# Patient Record
Sex: FEMALE | Race: WHITE | Employment: OTHER | URBAN - METROPOLITAN AREA
[De-identification: names, ages, dates, MRNs, and addresses within clinical notes are randomized per-mention and may not be internally consistent; named-entity substitution may affect disease eponyms.]

---

## 2017-03-07 ENCOUNTER — HOSPITAL ENCOUNTER (OUTPATIENT)
Dept: LAB | Age: 77
Discharge: HOME | End: 2017-03-07
Attending: INTERNAL MEDICINE
Payer: MEDICARE

## 2017-03-07 DIAGNOSIS — E05.90 HYPERTHYROIDISM: ICD-10-CM

## 2017-03-07 LAB
T3 SERPL-MCNC: 1.22 NG/ML (ref 0.6–1.81)
T4 FREE SERPL-MCNC: 0.9 NG/DL (ref 0.78–1.46)
TSH SERPL DL<=0.005 MIU/L-ACNC: 1.42 UIU/ML (ref 0.36–3.74)

## 2017-03-07 PROCEDURE — 84480 ASSAY TRIIODOTHYRONINE (T3): CPT | Performed by: INTERNAL MEDICINE

## 2017-03-07 PROCEDURE — 84443 ASSAY THYROID STIM HORMONE: CPT | Performed by: INTERNAL MEDICINE

## 2017-03-07 PROCEDURE — 84439 ASSAY OF FREE THYROXINE: CPT | Performed by: INTERNAL MEDICINE

## 2017-03-07 PROCEDURE — 36415 COLL VENOUS BLD VENIPUNCTURE: CPT | Performed by: INTERNAL MEDICINE

## 2017-07-10 ENCOUNTER — HOSPITAL ENCOUNTER (OUTPATIENT)
Dept: LAB | Age: 77
Discharge: HOME | End: 2017-07-10
Attending: INTERNAL MEDICINE
Payer: MEDICARE

## 2017-07-10 DIAGNOSIS — E05.90 HYPERTHYROIDISM: ICD-10-CM

## 2017-07-10 LAB
T3 SERPL-MCNC: 1.17 NG/ML (ref 0.6–1.81)
T4 FREE SERPL-MCNC: 1 NG/DL (ref 0.78–1.46)
TSH SERPL DL<=0.005 MIU/L-ACNC: 0.66 UIU/ML (ref 0.36–3.74)

## 2017-07-10 PROCEDURE — 84443 ASSAY THYROID STIM HORMONE: CPT | Performed by: INTERNAL MEDICINE

## 2017-07-10 PROCEDURE — 84439 ASSAY OF FREE THYROXINE: CPT | Performed by: INTERNAL MEDICINE

## 2017-07-10 PROCEDURE — 84480 ASSAY TRIIODOTHYRONINE (T3): CPT | Performed by: INTERNAL MEDICINE

## 2017-07-10 PROCEDURE — 36415 COLL VENOUS BLD VENIPUNCTURE: CPT | Performed by: INTERNAL MEDICINE

## 2017-11-10 ENCOUNTER — HOSPITAL ENCOUNTER (OUTPATIENT)
Dept: LAB | Age: 77
Discharge: HOME | End: 2017-11-10
Payer: MEDICARE

## 2017-11-10 DIAGNOSIS — E05.90 HYPERTHYROIDISM: ICD-10-CM

## 2017-11-10 LAB
T3 SERPL-MCNC: 1.22 NG/ML (ref 0.6–1.81)
T4 FREE SERPL-MCNC: 1 NG/DL (ref 0.78–1.46)
TSH SERPL DL<=0.005 MIU/L-ACNC: 1.12 UIU/ML (ref 0.36–3.74)

## 2017-11-10 PROCEDURE — 84439 ASSAY OF FREE THYROXINE: CPT | Performed by: INTERNAL MEDICINE

## 2017-11-10 PROCEDURE — 36415 COLL VENOUS BLD VENIPUNCTURE: CPT | Performed by: INTERNAL MEDICINE

## 2017-11-10 PROCEDURE — 84480 ASSAY TRIIODOTHYRONINE (T3): CPT | Performed by: INTERNAL MEDICINE

## 2017-11-10 PROCEDURE — 84443 ASSAY THYROID STIM HORMONE: CPT | Performed by: INTERNAL MEDICINE

## 2017-11-21 PROBLEM — M17.0 PRIMARY OSTEOARTHRITIS OF BOTH KNEES: Status: ACTIVE | Noted: 2017-11-21

## 2018-02-21 PROBLEM — E11.21 TYPE 2 DIABETES WITH NEPHROPATHY (HCC): Status: ACTIVE | Noted: 2018-02-21

## 2019-05-20 ENCOUNTER — HOSPITAL ENCOUNTER (OUTPATIENT)
Dept: LAB | Age: 79
Discharge: HOME | End: 2019-05-20
Payer: MEDICARE

## 2019-05-20 LAB
ALBUMIN SERPL-MCNC: 3.5 G/DL (ref 3.2–4.6)
ALBUMIN/GLOB SERPL: 0.9 {RATIO} (ref 1.2–3.5)
ALP SERPL-CCNC: 67 U/L (ref 50–136)
ALT SERPL-CCNC: 17 U/L (ref 12–65)
AST SERPL-CCNC: 20 U/L (ref 15–37)
BILIRUB DIRECT SERPL-MCNC: 0.1 MG/DL
BILIRUB SERPL-MCNC: 0.3 MG/DL (ref 0.2–1.1)
GLOBULIN SER CALC-MCNC: 3.8 G/DL (ref 2.3–3.5)
PROT SERPL-MCNC: 7.3 G/DL (ref 6.3–8.2)
T3 SERPL-MCNC: 1.11 NG/ML (ref 0.6–1.81)
T4 FREE SERPL-MCNC: 1 NG/DL (ref 0.78–1.46)
TSH SERPL DL<=0.005 MIU/L-ACNC: 0.79 UIU/ML (ref 0.36–3.74)

## 2019-05-20 PROCEDURE — 84480 ASSAY TRIIODOTHYRONINE (T3): CPT

## 2019-05-20 PROCEDURE — 80076 HEPATIC FUNCTION PANEL: CPT

## 2019-05-20 PROCEDURE — 84439 ASSAY OF FREE THYROXINE: CPT

## 2019-05-20 PROCEDURE — 36415 COLL VENOUS BLD VENIPUNCTURE: CPT

## 2019-05-20 PROCEDURE — 84443 ASSAY THYROID STIM HORMONE: CPT

## 2019-07-22 ENCOUNTER — HOSPITAL ENCOUNTER (OUTPATIENT)
Dept: MAMMOGRAPHY | Age: 79
Discharge: HOME | End: 2019-07-22
Attending: FAMILY MEDICINE
Payer: MEDICARE

## 2019-07-22 DIAGNOSIS — Z12.31 ENCOUNTER FOR SCREENING MAMMOGRAM FOR BREAST CANCER: ICD-10-CM

## 2019-07-22 PROCEDURE — 77067 SCR MAMMO BI INCL CAD: CPT

## 2019-12-16 PROBLEM — N18.30 CKD (CHRONIC KIDNEY DISEASE) STAGE 3, GFR 30-59 ML/MIN (HCC): Status: ACTIVE | Noted: 2019-12-16

## 2020-07-21 ENCOUNTER — HOSPITAL ENCOUNTER (OUTPATIENT)
Dept: LAB | Age: 80
Discharge: HOME | End: 2020-07-21
Payer: MEDICARE

## 2020-07-21 DIAGNOSIS — E05.90 HYPERTHYROIDISM: Chronic | ICD-10-CM

## 2020-07-21 LAB
ALBUMIN SERPL-MCNC: 3.6 G/DL (ref 3.2–4.6)
ALBUMIN/GLOB SERPL: 0.9 {RATIO} (ref 1.2–3.5)
ALP SERPL-CCNC: 67 U/L (ref 50–136)
ALT SERPL-CCNC: 19 U/L (ref 12–65)
AST SERPL-CCNC: 18 U/L (ref 15–37)
BILIRUB DIRECT SERPL-MCNC: 0.1 MG/DL
BILIRUB SERPL-MCNC: 0.3 MG/DL (ref 0.2–1.1)
GLOBULIN SER CALC-MCNC: 4.1 G/DL (ref 2.3–3.5)
PROT SERPL-MCNC: 7.7 G/DL (ref 6.3–8.2)
T3 SERPL-MCNC: 1.3 NG/ML (ref 0.6–1.81)
T4 FREE SERPL-MCNC: 1 NG/DL (ref 0.9–1.8)
TSH SERPL DL<=0.005 MIU/L-ACNC: 0.34 UIU/ML (ref 0.36–3.74)

## 2020-07-21 PROCEDURE — 84480 ASSAY TRIIODOTHYRONINE (T3): CPT

## 2020-07-21 PROCEDURE — 84443 ASSAY THYROID STIM HORMONE: CPT

## 2020-07-21 PROCEDURE — 84439 ASSAY OF FREE THYROXINE: CPT

## 2020-07-21 PROCEDURE — 80076 HEPATIC FUNCTION PANEL: CPT

## 2020-07-21 PROCEDURE — 36415 COLL VENOUS BLD VENIPUNCTURE: CPT

## 2021-03-04 ENCOUNTER — HOSPITAL ENCOUNTER (OUTPATIENT)
Dept: MRI IMAGING | Age: 81
Discharge: HOME | End: 2021-03-04
Attending: FAMILY MEDICINE
Payer: MEDICARE

## 2021-03-04 DIAGNOSIS — M25.551 RIGHT HIP PAIN: ICD-10-CM

## 2021-03-04 PROCEDURE — 73721 MRI JNT OF LWR EXTRE W/O DYE: CPT

## 2021-03-05 NOTE — PROGRESS NOTES
Tell patient that she has no fracture but she does have a partial tear of the hamstring muscle and partial tear of some of the other muscles around the hip.  ~We need to refer her to orthopedics.

## 2021-04-14 ENCOUNTER — HOSPITAL ENCOUNTER (OUTPATIENT)
Dept: PHYSICAL THERAPY | Age: 81
Discharge: HOME | End: 2021-04-14
Payer: MEDICARE

## 2021-04-14 PROCEDURE — 97110 THERAPEUTIC EXERCISES: CPT

## 2021-04-14 PROCEDURE — 97140 MANUAL THERAPY 1/> REGIONS: CPT

## 2021-04-14 PROCEDURE — 97161 PT EVAL LOW COMPLEX 20 MIN: CPT

## 2021-04-14 NOTE — THERAPY EVALUATION
Viktoria Springer : 1940 [place] Secondary: [place] [employer] [address] [facility] [id number] [facility], [address] [place] [address], [place] peterson, [address] [place] [address] Street [phone]   [phone] OUTPATIENT PHYSICAL THERAPY:Initial Assessment 2021 ICD-10: Treatment Diagnosis: Pain in right hip [M25.551] Treatment Diagnosis 2: Generalized Muscle Weakness [M62.81] Treatment Diagnosis 3: other abnormalities of gait and mobility [R26.89] Precautions: Anemia, Cardiomegaly, GERD (gastroesophageal reflux disease), Hypercholesterolemia, Hypertension, Hyperthyroidism,  Multiple thyroid nodules, Osteoarthritis, Pancreatitis, and Type 2 diabetes mellitus Allergies: Atorvastatin and Hydrocodone-acetaminophen TREATMENT PLAN: 
Effective Dates: 2021 TO 2021 (60 days). Frequency/Duration: 2 times a week for 60 Day(s) MEDICAL/REFERRING DIAGNOSIS: 
Pain in right hip [M25.551] Low back pain [M54.5] Spondylosis without myelopathy or radiculopathy, lumbar region [M47.816] DATE OF ONSET: 2020, slipped on dish soap on floor, fell and landed on floor REFERRING PHYSICIAN: Oleg Choi MD Orders: Evaluate and Treat Return MD Appointment: Patient is uncertain at this time. INITIAL ASSESSMENT:  Ms. Francois Floey is a 78 y.o. female presenting to physical therapy with complaints of right posterior thigh pain, tightness, difficulty walking, standing, sitting. She reports that on 2020 she slipped on dish soap on floor, fell. She reports that since fall her pain has progressively gotten worse. She reports having an X-Ray and LISETTE that per patient showed strained hamstring. She reports is has trouble sitting, standing, walking and is taking longer and having more difficulty dressing, bathing. She reports unable to clean, vacuum, mop. She reports having to have to  assist with these activities. Patient reports only getting any real relief with lying down. She reports trying ice and heat, but no real lasting help. Patient presents with increased pain, decreased strength, decreased ROM, decreased flexibility, impaired gait, impaired posture, impaired overhead reach, impaired transfer ability, decreased activity tolerance, and overall impaired functional mobility. Patient is a good candidate for skilled physical therapy interventions to include manual therapy, therapeutic exercise, balance training, gait training, transfer training, postural re-education, body mechanics training, and pain modalities as needed. PROBLEM LIST (Impacting functional limitations): 1. Decreased Strength 2. Decreased ADL/Functional Activities 3. Decreased Transfer Abilities 4. Decreased Ambulation Ability/Technique 5. Decreased Balance 6. Increased Pain 7. Decreased Activity Tolerance 8. Decreased Pacing Skills 9. Decreased Work Simplification/Energy Conservation Techniques 10. Decreased Flexibility/Joint Mobility 11. Edema/Girth 12. Decreased Knowledge of Precautions 13. Decreased [place] with Home Exercise Program INTERVENTIONS PLANNED: (Treatment may consist of any combination of the following) 1. Balance Exercise 2. Bed Mobility 3. Cryotherapy 4. Electrical Stimulation 5. Gait Training 6. Heat 7. Home Exercise Program (HEP) 8. Manual Therapy 9. Neuromuscular Re-education/Strengthening 10. Range of Motion (ROM) 11. Therapeutic Exercise/Strengthening 12. Transcutaneous Electrical Nerve Stimulation (TENS) 13. Ultrasound (US) 14. Trigger Point Dry Needling, (TPDN) GOALS: (Goals have been discussed and agreed upon with patient.) Short-Term Functional Goals: Time Frame: 4/14/2021 to 5/13/2021 1.  Patient demonstrates independence with home exercise program without verbal cueing provided by therapist.  
2. Patient will report no more than 2/10 right hamstring and hip pain at rest in order to demonstrate improved self pain control and tolerance with sitting, standing, walking. 3. Patient will be educated in and demonstrate proper squat lift technique in order to reduce stress on right hip and hamstring , improve safety, and reduce risk of injury. 4. Patient will improve hip flexion to 90 degrees to assist with improved sitting ability and tolerance to assist with eating and dressing, bathing. 5. Patient will improve strength to 4/5 to assist with standing tolerance for prolonged periods. Discharge Goals: Time Frame: 4/14/2021 to 6/13/2021 1. Patient will improve hip flexion to 110 degrees to assist with return to no difficulty with dressing and bathing with improved safety. 2. Patient will improve strength to 5/5 to be able to return to independent mopping, vacuuming, and cleaning. 3. Patient will improve single leg standing to 8 seconds or greater to assist with ascending and descending stairs, steps, curbs. 4. Patient will improve Lower Extremity Functional Scale score to 38/80 from 18/80. Outcome Measure: Tool Used: Lower Extremity Functional Scale (LEFS) Score:  Initial: 18/80 Most Recent: X/80 (Date: -- ) Interpretation of Score: 20 questions each scored on a 5 point scale with 0 representing \"extreme difficulty or unable to perform\" and 4 representing \"no difficulty\". The lower the score, the greater the functional disability. 80/80 represents no disability. Minimal detectable change is 9 points. Tool Used: Modified Oswestry Low Back Pain Questionnaire Score:  Initial: 25/50  Most Recent: X/50 (Date: -- ) Interpretation of Score: Each section is scored on a 0-5 scale, 5 representing the greatest disability. The scores of each section are added together for a total score of 50.    
 
Medical Necessity:  
· Patient is expected to demonstrate progress in strength, range of motion, balance, coordination and functional technique to decreased pain, swelling and improve/safety with dressing, bathing, walking, standing, sitting, cooking, cleaning, mopping, and all home and community functional activities. · Skilled intervention continues to be required due to increased pain, swelling, decreased range of motion, mobility, flexibility, muscle activation, strength, balance, and functional deficits. Reason for Services/Other Comments: 
· Patient continues to require skilled intervention due to decreased and limited functional abilities and increasing complexity of exercises. Total Evaluation Duration: 20 minutes Rehabilitation Potential For Stated Goals: Good Regarding Sil Tyrone's therapy, I certify that the treatment plan above will be carried out by a therapist or under their direction. Thank you for this referral, 
Rosette Kussmaul, PT, DPT, TPS Referring Physician Signature: Joshua Lee,* _______________________________ Date _____________ PAIN/SUBJECTIVE:  
 Initial: Pain Intensity 1: 9 Pain Location 1: Hip Pain Orientation 1: Right  Post Session:  4/10 HISTORY:  
 History of Injury/Illness (Reason for Referral): Ms. Cliff Puckett is a 78 y.o. female presenting to physical therapy with complaints of right posterior thigh pain, tightness, difficulty walking, standing, sitting. She reports that on 12/18/2020 she slipped on dish soap on floor, fell. She reports that since fall her pain has progressively gotten worse. She reports having an X-Ray and LISETTE that per patient showed strained hamstring. She reports is has trouble sitting, standing, walking and is taking longer and having more difficulty dressing, bathing. She reports unable to clean, vacuum, mop. She reports having to have to  assist with these activities. Patient reports only getting any real relief with lying down. She reports trying ice and heat, but no real lasting help. Past Medical History/Comorbidities: Ms. Cliff Puckett  has a past medical history of Anemia, Cardiomegaly, GERD (gastroesophageal reflux disease), Hypercholesterolemia, Hypertension, Hyperthyroidism, Menopause, Multiple thyroid nodules, Osteoarthritis, Pancreatitis, and Type 2 diabetes mellitus. Ms. Kristen Frazier  has a past surgical history that includes hx orthopaedic (as a child); hx endoscopy (5/21/2012); and hx breast biopsy (Left). Social History/Living Environment:  
  Patient lives with her . Patient has to be able to navigate stairs, steps, curbs. Prior Level of Function/Work/Activity: 
Patient was fully independent without limitations. Patient currently is requiring assist from  for cooking, cleaning, mopping, vacuuming. Patient is eager to return to full prior level of function. Dominant Side:  
      RIGHT Ambulatory/Rehab Services H2 Model Falls Risk Assessment Risk Factors: 
     No Risk Factors Identified Ability to Rise from Chair: 
     (1)  Pushes up, successful in one attempt Falls Prevention Plan: No modifications necessary Total: (5 or greater = High Risk): 1 ©2010 [employer] [place] . [facility] Patent #[id number]. Federal Law prohibits the replication, distribution or use without written permission from [employer] [place] OTR Current Medications:   
   
Current Outpatient Medications:  
  amLODIPine (NORVASC) 10 mg tablet, Take 1 Tab by mouth daily. , Disp: 90 Tab, Rfl: 3 
  glipiZIDE SR (Glucotrol XL) 10 mg CR tablet, Take 1 Tab by mouth daily. Indications: type 2 diabetes mellitus, Disp: 90 Tab, Rfl: 3 
  lisinopril-hydroCHLOROthiazide (PRINZIDE, ZESTORETIC) 20-12.5 mg per tablet, Take 2 Tabs by mouth daily. , Disp: 180 Tab, Rfl: 3 
  metFORMIN (GLUCOPHAGE) 500 mg tablet, Take 1 tab in the morning and 2 tab at night  Indications: type 2 diabetes mellitus, Disp: 270 Tab, Rfl: 3 
  rosuvastatin (Crestor) 20 mg tablet, Take 1 Tab by mouth nightly., Disp: 90 Tab, Rfl: 3 
  methIMAzole (TAPAZOLE) 5 mg tablet, Take 1 Tab by mouth daily. , Disp: 90 Tab, Rfl: 3 
  DISABLED PLACARD (DISABLED PLACARD) DMV, Handicap Placard Dx:Osteoarthritis, Disp: 1 Each, Rfl: 0 
  ascorbic acid, vitamin C, (VITAMIN C) 500 mg tablet, Take 500 mg by mouth., Disp: , Rfl:  
  aspirin delayed-release 81 mg tablet, Take 81 mg by mouth., Disp: , Rfl:  
  multivitamin (MULTIPLE VITAMINS) tablet, Take 1 Tab by mouth daily. , Disp: , Rfl:   
 Date Last Reviewed:  4/14/2021 Number of Personal Factors/Comorbidities that affect the Plan of Care: 
 1-2: MODERATE COMPLEXITY EXAMINATION:  
 Patient denies any LE paresthesia. Patient denies any increase of symptoms with cough, sneeze or valsalva. Patient denies any saddle paresthesia or bowel/bladder deficits. Observation/Orthostatic Postural Assessment:   
      Patient demonstrated decreased right lower extremity weight bearing both sitting and standing and with ambulation. Patient demonstrated anterior pelvic tilt. Palpation:   
      Patient demonstrated severe tightness, tenderness, trigger points, right medial and lateral hamstrings, gluteal lisa/medius/minimus. ROM:   
       
AROM/ PROM Left (degrees) Right (degrees) Hip Flexion 100 84 Hip External Rotation (ER) 48 15 Hip Internal Rotation (IR) 35 30 Hip Extension -10 -10 Hip Abduction 40 22 Knee Flexion 100 115 Knee Extension -10 -2 Strength: Motion Tested Left   (*/5) Right  (*/5) Knee Extension 3 3 Knee Flexion 3 3 Hip Flexion 4 3 Hip Extension 2 2 Hip Abduction 2 2 Ankle DF 4 3 Special Tests:   
      Positive Right SLR 27 degrees Hamstring Tightness, Left 70 degrees Passive Accessory Motion:  
      Lumbar Hypomobility, Right Hip Hypomobility All Directions Neurological Screen: Myotomes: Key muscle strength testing through bilateral LE is intact, deficits noted above. Dermatomes: Sensation to light touch for bilateral LE is intact from intact L3 to S1. Reflexes: Patellar (L3/ L4): Intact Achilles (S1/ S2): Intact Functional Mobility:  
      Gait/Ambulation:  Patient demonstrated decreased right hip flexion and extension, decreased right LE weight bearing. Patient ambulated with moderate to severe antalgic gait pattern. Transfers:  Patient required use of % of time. Balance:   
      Single Leg Balance: Right Unable secondary to pain. Left Unable Body Structures Involved: 1. Bones 2. Joints 3. Muscles 4. Ligaments Body Functions Affected: 1. Sensory/Pain 2. Neuromusculoskeletal 
3. Movement Related Activities and Participation Affected: 1. General Tasks and Demands 2. Mobility 3. Self Care 4. Domestic Life 5. Interpersonal Interactions and Relationships 6. Community, Social and [place] Number of elements (examined above) that affect the Plan of Care: 3: MODERATE COMPLEXITY CLINICAL PRESENTATION:  
 Presentation: 
 Stable and uncomplicated: LOW COMPLEXITY CLINICAL DECISION MAKING:  
 Use of outcome tool(s) and clinical judgement create a POC that gives a: Questionable prediction of patient's progress: MODERATE COMPLEXITY

## 2021-04-14 NOTE — PROGRESS NOTES
Stanley Snyder  : 1940  [place]  Secondary: [place] [employer] [address] [facility] [id number] [facility], [address] [place] [address], [place] peterson, [address] [place] [address]  [phone]   [phone]      OUTPATIENT PHYSICAL THERAPY: Daily Treatment Note 2021    ICD-10: Treatment Diagnosis: Pain in right hip [M25.551]                Treatment Diagnosis 2: Generalized Muscle Weakness [M62.81]                Treatment Diagnosis 3: other abnormalities of gait and mobility [R26.89]  Precautions: Anemia, Cardiomegaly, GERD (gastroesophageal reflux disease), Hypercholesterolemia, Hypertension, Hyperthyroidism,  Multiple thyroid nodules, Osteoarthritis, Pancreatitis, and Type 2 diabetes mellitus  Allergies: Atorvastatin and Hydrocodone-acetaminophen   TREATMENT PLAN:  Effective Dates: 2021 TO 2021 (60 days). Frequency/Duration: 2 times a week for 60 Day(s) MEDICAL/REFERRING DIAGNOSIS:  Pain in right hip [M25.551]  Low back pain [M54.5]  Spondylosis without myelopathy or radiculopathy, lumbar region [M47.816]   DATE OF ONSET: 2020, slipped on dish soap on floor, fell and landed on floor  REFERRING PHYSICIAN: Tio Hays,*  MD Orders: Evaluate and Treat   Return MD Appointment: Patient is uncertain at this time. Pre-treatment Symptoms/Complaints:  Patient reports that she is having trouble sitting, a lot of posterior thigh pain and tightness. She reports that she feels that she is sitting on a knot. Pain: Initial: Pain Intensity 1: 9  Pain Location 1: Hip  Pain Orientation 1: Right  Post Session:  4/10   Medications Last Reviewed:  2021  Updated Objective Findings:  See evaluation note from today   TREATMENT:   THERAPEUTIC EXERCISE: (14 minutes):  Exercises per grid below to improve mobility, strength, balance and coordination.   Required moderate visual, verbal, manual and tactile cues to promote proper body alignment, promote proper body posture, promote proper body mechanics and promote proper body breathing techniques. Progressed resistance, range, repetitions and complexity of movement as indicated. Date:  4/14/2021 Date:   Date:     Activity/Exercise Parameters Parameters Parameters   Posterior Pelvic Tilt 1 x 10     SKTC 2 sec hold 2 x 10 reps     Trunk Rotation Supine 3 x 10                               Time spent with patient reviewing proper muscle recruitment and technique with exercises. Therapeutic Neuroscience Education, Sensitive Nerve System 6 minutes    MANUAL THERAPY: (24 minutes): Joint mobilization and Soft tissue mobilization was utilized and necessary because of the patient's restricted joint motion, painful spasm, loss of articular motion and restricted motion of soft tissue   Soft Tissue Mobilization Medial and Lateral Hamstrings   Active Release Hamstrings, Adductors, Hip IR and ER    MODALITIES: (0 minutes):      None Today     HEP: As above; handouts given to patient for all exercises. Treatment/Session Summary:    · Response to Treatment:  Patient reported decreased tightness and pain with treatment. She reports increased tolerance to sitting and standing. She was instructed regarding an initial home exercise program. She would benefit from continued graded progression to return to prior level of function. · Communication/Consultation:  Home Exercise Program  · Equipment provided today:  None today  · Recommendations/Intent for next treatment session: Next visit will focus on mobility, flexibility, range of motion, graded muscle activation and strength, and return to functional abilities.     Total Treatment Billable Duration:  58 minutes: 20 minutes evaluation, 24 minutes manual therapy, 14 minutes therapeutic exercise  PT Patient Time In/Time Out  Time In: 0900  Time Out: [facility] [address], [address], PT

## 2021-04-15 ENCOUNTER — HOSPITAL ENCOUNTER (OUTPATIENT)
Dept: PHYSICAL THERAPY | Age: 81
Discharge: HOME | End: 2021-04-15
Payer: MEDICARE

## 2021-04-15 PROCEDURE — 97110 THERAPEUTIC EXERCISES: CPT

## 2021-04-15 PROCEDURE — 97140 MANUAL THERAPY 1/> REGIONS: CPT

## 2021-04-15 NOTE — PROGRESS NOTES
Sheila Dumont  : 1940  [place]  Secondary: [place] [employer] [address] [facility]  [address] [employer] [place], [address] [place] [address], [facility], [address] [place] [address]  [phone]   [phone]      OUTPATIENT PHYSICAL THERAPY: Daily Treatment Note 4/15/2021    ICD-10: Treatment Diagnosis: Pain in right hip [M25.551]                Treatment Diagnosis 2: Generalized Muscle Weakness [M62.81]                Treatment Diagnosis 3: other abnormalities of gait and mobility [R26.89]  Precautions: Anemia, Cardiomegaly, GERD (gastroesophageal reflux disease), Hypercholesterolemia, Hypertension, Hyperthyroidism,  Multiple thyroid nodules, Osteoarthritis, Pancreatitis, and Type 2 diabetes mellitus  Allergies: Atorvastatin and Hydrocodone-acetaminophen   TREATMENT PLAN:  Effective Dates: 2021 TO 2021 (60 days). Frequency/Duration: 2 times a week for 60 Day(s) MEDICAL/REFERRING DIAGNOSIS:  Pain in right hip [M25.551]  Low back pain [M54.5]  Spondylosis without myelopathy or radiculopathy, lumbar region [M47.816]   DATE OF ONSET: 2020, slipped on dish soap on floor, fell and landed on floor  REFERRING PHYSICIAN: Justin Plata,*  MD Orders: Evaluate and Treat   Return MD Appointment: Patient is uncertain at this time. Pre-treatment Symptoms/Complaints:  Patient reports that she felt so much better after initial session. She reports that she was tired was able to go home and take a nap. She reports that she also had a first full nights sleep in months, \"did not want to get up it felt so good. \"     Pain: Initial: Pain Intensity 1: 0  Pain Location 1: Hip  Pain Orientation 1: Right  Post Session:  0/10   Medications Last Reviewed:  4/15/2021  Updated Objective Findings:  Palpation: Right Medial and Lateral Hamstring Tightness, Tenderness Trigger Points.    TREATMENT:   THERAPEUTIC EXERCISE: (24 minutes):  Exercises per grid below to improve mobility, strength, balance and coordination. Required moderate visual, verbal, manual and tactile cues to promote proper body alignment, promote proper body posture, promote proper body mechanics and promote proper body breathing techniques. Progressed resistance, range, repetitions and complexity of movement as indicated. Date:  4/14/2021 Date:  4/15/2021 Date:     Activity/Exercise Parameters Parameters Parameters   Posterior Pelvic Tilt 1 x 10 1 x 10    SKTC 2 sec hold 2 x 10 reps     Trunk Rotation Supine 3 x 10 Supine 3 x 10    Hip Abduction  Seated 3 x 10 Yellow    Hip Flexion  Seated March 3 x 10 Yellow                  Time spent with patient reviewing proper muscle recruitment and technique with exercises. Therapeutic Neuroscience Education, Sensitive Nerve System 6 minutes    MANUAL THERAPY: (29 minutes): Joint mobilization and Soft tissue mobilization was utilized and necessary because of the patient's restricted joint motion, painful spasm, loss of articular motion and restricted motion of soft tissue   Soft Tissue Mobilization Medial and Lateral Hamstrings   Active Release Hamstrings, Adductors, Hip IR and ER   Hip IR/ER Log Roll Mobilization Grade II/III    MODALITIES: (0 minutes):      None Today     HEP: As above; handouts given to patient for all exercises. Treatment/Session Summary:    · Response to Treatment:  Patient reported no pain during or after treatment. She continues to have joint and soft tissue restrictions. She was able to tolerate graded strengthening, responded well to manual interventions. She would benefit from continued progression of mobility, flexibility, and muscle activation to progress strength and stability functionally with all daily activities.   · Communication/Consultation:  Home Exercise Program  · Equipment provided today:  None today  · Recommendations/Intent for next treatment session: Next visit will focus on mobility, flexibility, range of motion, graded muscle activation and strength, and return to functional abilities.     Total Treatment Billable Duration:  53 minutes  PT Patient Time In/Time Out  Time In: 1001  Time Out: [facility], PT

## 2021-04-19 ENCOUNTER — HOSPITAL ENCOUNTER (OUTPATIENT)
Dept: PHYSICAL THERAPY | Age: 81
Discharge: HOME | End: 2021-04-19
Payer: MEDICARE

## 2021-04-19 PROCEDURE — 97140 MANUAL THERAPY 1/> REGIONS: CPT

## 2021-04-19 PROCEDURE — 97110 THERAPEUTIC EXERCISES: CPT

## 2021-04-19 NOTE — PROGRESS NOTES
Denton Gomez  : 1940  [place]  Secondary: [place] [employer] [address] at [facility]  [address] [place], [address] [place] [address], [place] peterson, [address] [place] [address]  [phone]   [phone]      OUTPATIENT PHYSICAL THERAPY: Daily Treatment Note 2021    ICD-10: Treatment Diagnosis: Pain in right hip [M25.551]                Treatment Diagnosis 2: Generalized Muscle Weakness [M62.81]                Treatment Diagnosis 3: other abnormalities of gait and mobility [R26.89]  Precautions: Anemia, Cardiomegaly, GERD (gastroesophageal reflux disease), Hypercholesterolemia, Hypertension, Hyperthyroidism,  Multiple thyroid nodules, Osteoarthritis, Pancreatitis, and Type 2 diabetes mellitus  Allergies: Atorvastatin and Hydrocodone-acetaminophen   TREATMENT PLAN:  Effective Dates: 2021 TO 2021 (60 days). Frequency/Duration: 2 times a week for 60 Day(s) MEDICAL/REFERRING DIAGNOSIS:  Pain in right hip [M25.551]  Low back pain [M54.5]  Spondylosis without myelopathy or radiculopathy, lumbar region [M47.816]   DATE OF ONSET: 2020, slipped on dish soap on floor, fell and landed on floor  REFERRING PHYSICIAN: Terrence Palafox,*  MD Orders: Evaluate and Treat   Return MD Appointment: Patient is uncertain at this time. Pre-treatment Symptoms/Complaints:  Patient reports that she is sleeping better and better each night. She reports just a little bit of pain. Pain: Initial: Pain Intensity 1: 2  Pain Location 1: Hip  Pain Orientation 1: Right  Post Session:  0/10   Medications Last Reviewed:  2021  Updated Objective Findings:  Palpation: Right Medial and Lateral Hamstring Tightness, Tenderness Trigger Points. TREATMENT:   THERAPEUTIC EXERCISE: (24 minutes):  Exercises per grid below to improve mobility, strength, balance and coordination.   Required moderate visual, verbal, manual and tactile cues to promote proper body alignment, promote proper body posture, promote proper body mechanics and promote proper body breathing techniques. Progressed resistance, range, repetitions and complexity of movement as indicated. Date:  4/14/2021 Date:  4/15/2021 Date:  4/19/2021   Activity/Exercise Parameters Parameters Parameters   Posterior Pelvic Tilt 1 x 10 1 x 10    SKTC 2 sec hold 2 x 10 reps  5 sec x 20   Trunk Rotation Supine 3 x 10 Supine 3 x 10 Supine 3 x 10   Hip Abduction  Seated 3 x 10 Yellow Seated 3 x 10 Yellow   Hip Flexion  Seated March 3 x 10 Yellow Seated March 3 x 10 Yellow   Calf Stretch   3 x 30 sec Slantboard           Time spent with patient reviewing proper muscle recruitment and technique with exercises. MANUAL THERAPY: (29 minutes): Joint mobilization and Soft tissue mobilization was utilized and necessary because of the patient's restricted joint motion, painful spasm, loss of articular motion and restricted motion of soft tissue   Soft Tissue Mobilization Medial and Lateral Hamstrings   Active Release Hamstrings, Adductors, Hip IR and ER   Hip IR/ER Log Roll Mobilization Grade II/III    MODALITIES: (0 minutes):      None Today     HEP: As above; handouts given to patient for all exercises. Treatment/Session Summary:    · Response to Treatment:  Patient continued to have hamstring tightness, tenderness, trigger points, but responds well to manual interventions followed by graded exercise. She requires rest breaks due to reported fatigue. She would benefit from continued graded and careful progression to progress back to prior level of function. · Communication/Consultation:  Home Exercise Program  · Equipment provided today:  None today  · Recommendations/Intent for next treatment session: Next visit will focus on mobility, flexibility, range of motion, graded muscle activation and strength, and return to functional abilities.     Total Treatment Billable Duration:  53 minutes  PT Patient Time In/Time Out  Time In: 1267  Time Out: 0412 [facility], PT

## 2021-04-22 ENCOUNTER — HOSPITAL ENCOUNTER (OUTPATIENT)
Dept: PHYSICAL THERAPY | Age: 81
Discharge: HOME | End: 2021-04-22
Payer: MEDICARE

## 2021-04-22 PROCEDURE — 97110 THERAPEUTIC EXERCISES: CPT

## 2021-04-22 PROCEDURE — 97140 MANUAL THERAPY 1/> REGIONS: CPT

## 2021-04-22 NOTE — PROGRESS NOTES
Viktoria Springer  : 1940  [place]  Secondary: [place] [employer] [address] [facility]  [address] [place] [facility], [address] [place] [address], [place], [address] [place] [address]  [phone]   [phone]      OUTPATIENT PHYSICAL THERAPY: Daily Treatment Note 2021    ICD-10: Treatment Diagnosis: Pain in right hip [M25.551]                Treatment Diagnosis 2: Generalized Muscle Weakness [M62.81]                Treatment Diagnosis 3: other abnormalities of gait and mobility [R26.89]  Precautions: Anemia, Cardiomegaly, GERD (gastroesophageal reflux disease), Hypercholesterolemia, Hypertension, Hyperthyroidism,  Multiple thyroid nodules, Osteoarthritis, Pancreatitis, and Type 2 diabetes mellitus  Allergies: Atorvastatin and Hydrocodone-acetaminophen   TREATMENT PLAN:  Effective Dates: 2021 TO 2021 (60 days). Frequency/Duration: 2 times a week for 60 Day(s) MEDICAL/REFERRING DIAGNOSIS:  Pain in right hip [M25.551]  Low back pain [M54.5]  Spondylosis without myelopathy or radiculopathy, lumbar region [M47.816]   DATE OF ONSET: 2020, slipped on dish soap on floor, fell and landed on floor  REFERRING PHYSICIAN: Oleg Choi,*  MD Orders: Evaluate and Treat   Return MD Appointment: Patient is uncertain at this time. Pre-treatment Symptoms/Complaints:  Patient reports that she continues to feel better and is sleeping better, still has trace right posterior thigh pain. Pain: Initial: Pain Intensity 1: 2  Pain Location 1: Hip  Pain Orientation 1: Right  Post Session:  0/10   Medications Last Reviewed:  2021  Updated Objective Findings:  Palpation: Right Medial and Lateral Hamstring Tightness, Tenderness Trigger Points. TREATMENT:   THERAPEUTIC EXERCISE: (24 minutes):  Exercises per grid below to improve mobility, strength, balance and coordination.   Required moderate visual, verbal, manual and tactile cues to promote proper body alignment, promote proper body posture, promote proper body mechanics and promote proper body breathing techniques. Progressed resistance, range, repetitions and complexity of movement as indicated. Date:  4/22/2021 Date:  4/15/2021 Date:  4/19/2021   Activity/Exercise Parameters Parameters Parameters   Posterior Pelvic Tilt  1 x 10    SKTC   5 sec x 20   Trunk Rotation Supine 3 x 10 Supine 3 x 10 Supine 3 x 10   Hip Abduction Seated 3 x 10 Yellow Seated 3 x 10 Yellow Seated 3 x 10 Yellow   Hip Flexion Seated March 3 x 10 Yellow Seated March 3 x 10 Yellow Seated March 3 x 10 Yellow   Calf Stretch 3 x 30 sec Slantboard  3 x 30 sec Slantboard   Hip and Knee Flexion/Extension With swiss ball 3 x 10 Therapist Assisted     Knee Flexion Seated Resisted 3 x 10 Yellow       Time spent with patient reviewing proper muscle recruitment and technique with exercises. MANUAL THERAPY: (29 minutes): Joint mobilization and Soft tissue mobilization was utilized and necessary because of the patient's restricted joint motion, painful spasm, loss of articular motion and restricted motion of soft tissue   Soft Tissue Mobilization Medial and Lateral Hamstrings   Active Release Hamstrings, Adductors, Hip IR and ER   Hip IR/ER Log Roll Mobilization Grade II/III    MODALITIES: (0 minutes):      None Today     HEP: As above; handouts given to patient for all exercises. Treatment/Session Summary:    · Response to Treatment:  Patient continued to have trace to mild right hamstring tightness and trigger points. She reported symptom relief with treatment. She demonstrated improved gait pattern, improved stride length and overall stability. Patient would benefit from continued graded progression of mobility and strength to progress functionally and from a safety standpoint.   · Communication/Consultation:  Home Exercise Program  · Equipment provided today:  None today  · Recommendations/Intent for next treatment session: Next visit will focus on mobility, flexibility, range of motion, graded muscle activation and strength, and return to functional abilities.     Total Treatment Billable Duration:  53 minutes  PT Patient Time In/Time Out  Time In: 1100  Time Out: 4900 [facility] , PT

## 2021-04-28 ENCOUNTER — HOSPITAL ENCOUNTER (OUTPATIENT)
Dept: PHYSICAL THERAPY | Age: 81
Discharge: HOME | End: 2021-04-28
Payer: MEDICARE

## 2021-04-28 PROCEDURE — 97110 THERAPEUTIC EXERCISES: CPT

## 2021-04-28 PROCEDURE — 97140 MANUAL THERAPY 1/> REGIONS: CPT

## 2021-04-28 NOTE — PROGRESS NOTES
Shannan Conquest  : 1940  [place]  Secondary: [place] [employer] [address] [facility]  [address] [place] [facility], [address] [place] [address], [place], [address] [place] [address]  [phone]   [phone]      OUTPATIENT PHYSICAL THERAPY: Daily Treatment Note 2021    ICD-10: Treatment Diagnosis: Pain in right hip [M25.551]                Treatment Diagnosis 2: Generalized Muscle Weakness [M62.81]                Treatment Diagnosis 3: other abnormalities of gait and mobility [R26.89]  Precautions: Anemia, Cardiomegaly, GERD (gastroesophageal reflux disease), Hypercholesterolemia, Hypertension, Hyperthyroidism,  Multiple thyroid nodules, Osteoarthritis, Pancreatitis, and Type 2 diabetes mellitus  Allergies: Atorvastatin and Hydrocodone-acetaminophen   TREATMENT PLAN:  Effective Dates: 2021 TO 2021 (60 days). Frequency/Duration: 2 times a week for 60 Day(s) MEDICAL/REFERRING DIAGNOSIS:  Pain in right hip [M25.551]  Low back pain [M54.5]  Spondylosis without myelopathy or radiculopathy, lumbar region [M47.816]   DATE OF ONSET: 2020, slipped on dish soap on floor, fell and landed on floor  REFERRING PHYSICIAN: Evette Matthew,*  MD Orders: Evaluate and Treat   Return MD Appointment: Patient is uncertain at this time. Pre-treatment Symptoms/Complaints:  Patient reports that she continues to feel better and is sleeping better, still has trace right posterior thigh pain. Pain: Initial: Pain Intensity 1: 2  Pain Location 1: Hip  Pain Orientation 1: Right  Post Session:  0/10   Medications Last Reviewed:  2021  Updated Objective Findings:  Palpation: Right Medial and Lateral Hamstring Tightness, Tenderness Trigger Points, Medial greater then Lateral.   TREATMENT:   THERAPEUTIC EXERCISE: (24 minutes):  Exercises per grid below to improve mobility, strength, balance and coordination.   Required moderate visual, verbal, manual and tactile cues to promote proper body alignment, promote proper body posture, promote proper body mechanics and promote proper body breathing techniques. Progressed resistance, range, repetitions and complexity of movement as indicated. Date:  4/22/2021 Date:  4/28/2021 Date:  4/19/2021   Activity/Exercise Parameters Parameters Parameters   Posterior Pelvic Tilt  1 x 10    SKTC  5 x 10 R LE Therapist Assisted 5 sec x 20   Trunk Rotation Supine 3 x 10 Supine 3 x 10 Supine 3 x 10   Hip Abduction Seated 3 x 10 Yellow Seated 3 x 10 Yellow Seated 3 x 10 Yellow   Hip Flexion Seated March 3 x 10 Yellow Seated March 3 x 10 Yellow Seated March 3 x 10 Yellow   Calf Stretch 3 x 30 sec Slantboard  3 x 30 sec Slantboard   Hip and Knee Flexion/Extension With swiss ball 3 x 10 Therapist Assisted     Knee Flexion Seated Resisted 3 x 10 Yellow       Time spent with patient reviewing proper muscle recruitment and technique with exercises. MANUAL THERAPY: (30 minutes): Joint mobilization and Soft tissue mobilization was utilized and necessary because of the patient's restricted joint motion, painful spasm, loss of articular motion and restricted motion of soft tissue   Soft Tissue Mobilization Medial and Lateral Hamstrings   Active Release Hamstrings, Adductors, Hip IR and ER   Hip IR/ER Log Roll Mobilization Grade II/III    MODALITIES: (0 minutes):      None Today     HEP: As above; handouts given to patient for all exercises. Treatment/Session Summary:    · Response to Treatment:  Patient overall has demonstrated continued decreased pain, improved mobility and movement. She continues to have mid right hamstring tightness, tenderness, trigger points. She has been able to tolerate graded muscle activation and strengthening. She would benefit from progression per tolerance to progress back to full prior level of function.   · Communication/Consultation:  Home Exercise Program  · Equipment provided today:  None today  · Recommendations/Intent for next treatment session: Next visit will focus on mobility, flexibility, range of motion, graded muscle activation and strength, and return to functional abilities. Please progress graded and per patient tolerance.     Total Treatment Billable Duration:  54 minutes  PT Patient Time In/Time Out  Time In: 0900  Time Out: Willy Guerra PT

## 2021-04-28 NOTE — PROGRESS NOTES
Shannan Conquest  : 1940  [place]  Secondary: [place] [employer] [address] at [facility]  [address] [place] [facility], [address] [place] [address], [place], [address] [place] [address] Street  [phone]   [phone]       OUTPATIENT PHYSICAL THERAPY:Progress Report 2021    ICD-10: Treatment Diagnosis: Pain in right hip [M25.551]                Treatment Diagnosis 2: Generalized Muscle Weakness [M62.81]                Treatment Diagnosis 3: other abnormalities of gait and mobility [R26.89]  Precautions: Anemia, Cardiomegaly, GERD (gastroesophageal reflux disease), Hypercholesterolemia, Hypertension, Hyperthyroidism,  Multiple thyroid nodules, Osteoarthritis, Pancreatitis, and Type 2 diabetes mellitus  Allergies: Atorvastatin and Hydrocodone-acetaminophen   TREATMENT PLAN:  Effective Dates: 2021 TO 2021 (60 days). Frequency/Duration: 2 times a week for 60 Day(s) MEDICAL/REFERRING DIAGNOSIS:  Pain in right hip [M25.551]  Low back pain [M54.5]  Spondylosis without myelopathy or radiculopathy, lumbar region [M47.816]   DATE OF ONSET: 2020, slipped on dish soap on floor, fell and landed on floor  REFERRING PHYSICIAN: Reynaldo Strong,*  MD Orders: Evaluate and Treat   Return MD Appointment: Patient is uncertain at this time. INITIAL ASSESSMENT:  Ms. Susan Cunningham is a 78 y.o. female presenting to physical therapy with complaints of right posterior thigh pain, tightness, difficulty walking, standing, sitting. She reports that on 2020 she slipped on dish soap on floor, fell. She reports that since fall her pain has progressively gotten worse. She reports having an X-Ray and LISETTE that per patient showed strained hamstring. She reports is has trouble sitting, standing, walking and is taking longer and having more difficulty dressing, bathing. She reports unable to clean, vacuum, mop. She reports having to have to  assist with these activities. Patient reports only getting any real relief with lying down. She reports trying ice and heat, but no real lasting help. Patient presents with increased pain, decreased strength, decreased ROM, decreased flexibility, impaired gait, impaired posture, impaired overhead reach, impaired transfer ability, decreased activity tolerance, and overall impaired functional mobility. Patient is a good candidate for skilled physical therapy interventions to include manual therapy, therapeutic exercise, balance training, gait training, transfer training, postural re-education, body mechanics training, and pain modalities as needed. PROGRESS REPORT:  Ms. Sari Liang is a 78 y.o. female presenting to physical therapy with complaints of right posterior thigh pain, tightness, difficulty walking, standing, sitting. She has reported significantly decreased pain and improved sleep and function. She continues to have trace to mild pain first this in the morning, but his is improving. She continues to have limitations in soft tissue mobility and muscle strength. Patient has been seen for 5 sessions of physical therapy from 4/14/2021 to 4/28/2021. She reports feeling 75-80% better with all daily activities including sleeping. Patient will benefit from continued skilled physical therapy to address remaining goals and deficits. PROBLEM LIST (Impacting functional limitations):  1. Decreased Strength  2. Decreased ADL/Functional Activities  3. Decreased Transfer Abilities  4. Decreased Ambulation Ability/Technique  5. Decreased Balance  6. Increased Pain  7. Decreased Activity Tolerance  8. Decreased Pacing Skills  9. Decreased Work Simplification/Energy Conservation Techniques  10. Decreased Flexibility/Joint Mobility  11. Edema/Girth  12. Decreased Knowledge of Precautions  13. Decreased [place] with Home Exercise Program INTERVENTIONS PLANNED: (Treatment may consist of any combination of the following)  1. Balance Exercise  2.  Bed Mobility  3. Cryotherapy  4. Electrical Stimulation  5. Gait Training  6. Heat  7. Home Exercise Program (HEP)  8. Manual Therapy  9. Neuromuscular Re-education/Strengthening  10. Range of Motion (ROM)  11. Therapeutic Exercise/Strengthening  12. Transcutaneous Electrical Nerve Stimulation (TENS)  13. Ultrasound (US)  14. Trigger Point Dry Needling, (TPDN)     GOALS: (Goals have been discussed and agreed upon with patient.)  Short-Term Functional Goals: Time Frame: 4/14/2021 to 5/13/2021  1. Patient demonstrates independence with home exercise program without verbal cueing provided by therapist. -ONGOING  2. Patient will report no more than 2/10 right hamstring and hip pain at rest in order to demonstrate improved self pain control and tolerance with sitting, standing, walking. -MET  3. Patient will be educated in and demonstrate proper squat lift technique in order to reduce stress on right hip and hamstring , improve safety, and reduce risk of injury. -ONGOING  4. Patient will improve hip flexion to 90 degrees to assist with improved sitting ability and tolerance to assist with eating and dressing, bathing. -MET  5. Patient will improve strength to 4/5 to assist with standing tolerance for prolonged periods. -ONGOING  Discharge Goals: Time Frame: 4/14/2021 to 6/13/2021  1. Patient will improve hip flexion to 110 degrees to assist with return to no difficulty with dressing and bathing with improved safety. -ONGOING  2. Patient will improve strength to 5/5 to be able to return to independent mopping, vacuuming, and cleaning. -ONGOING  3. Patient will improve single leg standing to 8 seconds or greater to assist with ascending and descending stairs, steps, curbs. -ONGOING  4. Patient will improve Lower Extremity Functional Scale score to 38/80 from 18/80. -MET    Outcome Measure:    Tool Used: Lower Extremity Functional Scale (LEFS)  Score:  Initial: 18/80 Most Recent: 73/80 (Date: 4/28/2021 )   Interpretation of Score: 20 questions each scored on a 5 point scale with 0 representing \"extreme difficulty or unable to perform\" and 4 representing \"no difficulty\". The lower the score, the greater the functional disability. 80/80 represents no disability. Minimal detectable change is 9 points. Tool Used: Modified Oswestry Low Back Pain Questionnaire  Score:  Initial: 25/50  Most Recent: 4/50 (Date: 4/28/2021 )   Interpretation of Score: Each section is scored on a 0-5 scale, 5 representing the greatest disability. The scores of each section are added together for a total score of 50. UPDATED OBJECTIVE MEASURES:  Patient denies any LE paresthesia. Patient denies any increase of symptoms with cough, sneeze or valsalva. Patient denies any saddle paresthesia or bowel/bladder deficits. Observation/Orthostatic Postural Assessment:          Patient demonstrated decreased right lower extremity weight bearing both sitting and standing and with ambulation. Patient demonstrated anterior pelvic tilt. Palpation:          Patient demonstrated mild to moderate tightness, tenderness, trigger points, right medial and lateral hamstrings, gluteal lisa/medius/minimus. ROM:            AROM/ PROM Left (degrees) Right (degrees)   Hip Flexion 100 111 (from 84)   Hip External Rotation (ER) 48 32 (from 15)   Hip Internal Rotation (IR) 35 35 (from 30)   Hip Extension -10 -10   Hip Abduction 40 34 (from 22)   Knee Flexion 100 115   Knee Extension -10 -2     Strength:             Motion Tested Left   (*/5) Right  (*/5)   Knee Extension 3 4 (from 3)   Knee Flexion 3 3   Hip Flexion 4 3   Hip Extension 2 2   Hip Abduction 2 2   Ankle DF 4 3     Special Tests:         Negative Right 72 degrees (from Positive Right SLR 27 degrees Hamstring Tightness), Left 70 degrees  Passive Accessory Motion:         Lumbar Hypomobility, Right Hip Hypomobility All Directions  Neurological Screen:              Myotomes: Key muscle strength testing through bilateral LE is intact, deficits noted above. Dermatomes: Sensation to light touch for bilateral LE is intact from intact L3 to S1. Reflexes: Patellar (L3/ L4): Intact                 Achilles (S1/ S2): Intact          Functional Mobility:         Gait/Ambulation:  Patient demonstrated slightly decreased right hip flexion and extension, decreased right LE weight bearing. Patient ambulated with moderate to severe antalgic gait pattern. Transfers:  Patient required use of UE 50% of time. Balance:          Single Leg Balance: Right Unable secondary to pain. Left Unable    Medical Necessity:   · Patient is expected to demonstrate progress in strength, range of motion, balance, coordination and functional technique to decreased pain, swelling and improve/safety with dressing, bathing, walking, standing, sitting, cooking, cleaning, mopping, and all home and community functional activities. · Skilled intervention continues to be required due to increased pain, swelling, decreased range of motion, mobility, flexibility, muscle activation, strength, balance, and functional deficits. Reason for Services/Other Comments:  · Patient continues to require skilled intervention due to decreased and limited functional abilities and increasing complexity of exercises. Rehabilitation Potential For Stated Goals: Good  Regarding Monique Blanco therapy, I certify that the treatment plan above will be carried out by a therapist or under their direction. Thank you for this referral,  Nunu Andres, PT, DPT, TPS  Referring Physician Signature: Ernestina Ndiaye,* No Signature is Required for this note.

## 2021-04-30 ENCOUNTER — HOSPITAL ENCOUNTER (OUTPATIENT)
Dept: PHYSICAL THERAPY | Age: 81
Discharge: HOME | End: 2021-04-30
Payer: MEDICARE

## 2021-04-30 PROCEDURE — 97140 MANUAL THERAPY 1/> REGIONS: CPT

## 2021-04-30 PROCEDURE — 97110 THERAPEUTIC EXERCISES: CPT

## 2021-04-30 NOTE — PROGRESS NOTES
Gisel Barragan  : 1940  [place]  Secondary: [place] [employer] [address] [facility] at [facility]  [address] [facility], [address] [place] [address], [address]  [phone]   [phone]      OUTPATIENT PHYSICAL THERAPY: Daily Treatment Note 2021    ICD-10: Treatment Diagnosis: Pain in right hip [M25.551]                Treatment Diagnosis 2: Generalized Muscle Weakness [M62.81]                Treatment Diagnosis 3: other abnormalities of gait and mobility [R26.89]  Precautions: Anemia, Cardiomegaly, GERD (gastroesophageal reflux disease), Hypercholesterolemia, Hypertension, Hyperthyroidism,  Multiple thyroid nodules, Osteoarthritis, Pancreatitis, and Type 2 diabetes mellitus  Allergies: Atorvastatin and Hydrocodone-acetaminophen   TREATMENT PLAN:  Effective Dates: 2021 TO 2021 (60 days). Frequency/Duration: 2 times a week for 60 Day(s) MEDICAL/REFERRING DIAGNOSIS:  Pain in right hip [M25.551]  Low back pain [M54.5]  Spondylosis without myelopathy or radiculopathy, lumbar region [M47.816]   DATE OF ONSET: 2020, slipped on dish soap on floor, fell and landed on floor  REFERRING PHYSICIAN: Dyan Rubio,*  MD Orders: Evaluate and Treat   Return MD Appointment: Patient is uncertain at this time. Pre-treatment Symptoms/Complaints:  Patient reports steady improvement with therapy. Reports minimal pain and sleeping much better. .     Pain: Initial: Pain Intensity 1: 2  Pain Location 1: Leg  Pain Orientation 1: Right, Posterior  Post Session:  0/10   Medications Last Reviewed:  2021  Updated Objective Findings:  Palpation: Right Medial and Lateral Hamstring Tightness, Tenderness Trigger Points, Medial greater then Lateral.   TREATMENT:   THERAPEUTIC EXERCISE: (24 minutes):  Exercises per grid below to improve mobility, strength, balance and coordination.   Required moderate visual, verbal, manual and tactile cues to promote proper body alignment, promote proper body posture, promote proper body mechanics and promote proper body breathing techniques. Progressed resistance, range, repetitions and complexity of movement as indicated. Date:  4/22/2021 Date:  4/28/2021 Date:  4/30/2021   Activity/Exercise Parameters Parameters Parameters   Posterior Pelvic Tilt  1 x 10 2 x 10   SKTC  5 x 10 R LE Therapist Assisted 5 sec x 20   Trunk Rotation Supine 3 x 10 Supine 3 x 10 Supine 3 x 10   Hip Abduction Seated 3 x 10 Yellow Seated 3 x 10 Yellow Seated 3 x 10 red    Hip Flexion Seated March 3 x 10 Yellow Seated March 3 x 10 Yellow Seated March 3 x 10 Yellow   Calf Stretch 3 x 30 sec Slantboard  3 x 30 sec Slantboard   Hip and Knee Flexion/Extension With swiss ball 3 x 10 Therapist Assisted     Knee Flexion Seated Resisted 3 x 10 Yellow  Yellow 3 x 10     Time spent with patient reviewing proper muscle recruitment and technique with exercises. MANUAL THERAPY: (30 minutes): Joint mobilization and Soft tissue mobilization was utilized and necessary because of the patient's restricted joint motion, painful spasm, loss of articular motion and restricted motion of soft tissue   Soft Tissue Mobilization Medial and Lateral Hamstrings   Active Release Hamstrings, Adductors, Hip IR and ER   Hip IR/ER Log Roll Mobilization Grade II/III    MODALITIES: (0 minutes):      None Today     HEP: As above; handouts given to patient for all exercises. Treatment/Session Summary:    · Response to Treatment:  Patient tolerated exercises well. Requires frequent rest breaks. .  · Communication/Consultation:  Home Exercise Program  · Equipment provided today:  None today  · Recommendations/Intent for next treatment session: Next visit will focus on mobility, flexibility, range of motion, graded muscle activation and strength, and return to functional abilities. Please progress graded and per patient tolerance.     Total Treatment Billable Duration:  54 minutes  PT Patient Time In/Time Out  Time In: 1000  Time Out: Magdy Del Cid, KEERTHI

## 2021-05-04 ENCOUNTER — HOSPITAL ENCOUNTER (OUTPATIENT)
Dept: PHYSICAL THERAPY | Age: 81
Discharge: HOME | End: 2021-05-04
Payer: MEDICARE

## 2021-05-04 PROCEDURE — 97140 MANUAL THERAPY 1/> REGIONS: CPT

## 2021-05-04 PROCEDURE — 97110 THERAPEUTIC EXERCISES: CPT

## 2021-05-04 NOTE — PROGRESS NOTES
Brien Wilkes  : 1940  [place]  Secondary: [place] [employer] [address] at [address]  [address] [place], [address], [place], [address] [place] [address] Street  [phone]   [phone]      OUTPATIENT PHYSICAL THERAPY: Daily Treatment Note 2021    ICD-10: Treatment Diagnosis: Pain in right hip [M25.551]                Treatment Diagnosis 2: Generalized Muscle Weakness [M62.81]                Treatment Diagnosis 3: other abnormalities of gait and mobility [R26.89]  Precautions: Anemia, Cardiomegaly, GERD (gastroesophageal reflux disease), Hypercholesterolemia, Hypertension, Hyperthyroidism,  Multiple thyroid nodules, Osteoarthritis, Pancreatitis, and Type 2 diabetes mellitus  Allergies: Atorvastatin and Hydrocodone-acetaminophen   TREATMENT PLAN:  Effective Dates: 2021 TO 2021 (60 days). Frequency/Duration: 2 times a week for 60 Day(s) MEDICAL/REFERRING DIAGNOSIS:  Pain in right hip [M25.551]  Low back pain [M54.5]  Spondylosis without myelopathy or radiculopathy, lumbar region [M47.816]   DATE OF ONSET: 2020, slipped on dish soap on floor, fell and landed on floor  REFERRING PHYSICIAN: Karmen Robert,*  MD Orders: Evaluate and Treat   Return MD Appointment: Patient is uncertain at this time. Pre-treatment Symptoms/Complaints:  Patient reports that she is continue to feel better. Pain: Initial: Pain Intensity 1: 2  Pain Location 1: Leg  Pain Orientation 1: Right, Posterior  Post Session:  0/10   Medications Last Reviewed:  2021  Updated Objective Findings:  Palpation: Right Medial and Lateral Hamstring Tightness, Tenderness Trigger Points, Medial greater then Lateral.   TREATMENT:   THERAPEUTIC EXERCISE: (24 minutes):  Exercises per grid below to improve mobility, strength, balance and coordination.   Required moderate visual, verbal, manual and tactile cues to promote proper body alignment, promote proper body posture, promote proper body mechanics and promote proper body breathing techniques. Progressed resistance, range, repetitions and complexity of movement as indicated. Date:  5/4/2021 Date:  4/28/2021 Date:  4/30/2021   Activity/Exercise Parameters Parameters Parameters   Posterior Pelvic Tilt  1 x 10 2 x 10   SKTC 5 sec x 20 5 x 10 R LE Therapist Assisted 5 sec x 20   Trunk Rotation Supine 3 x 10 Supine 3 x 10 Supine 3 x 10   Hip Abduction  Seated 3 x 10 Yellow Seated 3 x 10 red    Hip Flexion  Seated March 3 x 10 Yellow Seated March 3 x 10 Yellow   Calf Stretch   3 x 30 sec Slantboard   Hip and Knee Flexion/Extension      Knee Flexion   Yellow 3 x 10   Hamstring Curl 3 x 10 11.5 lbs     Trunk Flexion  3 x 10 with Swiss Ball     Cone Stepping UP and OVER FWD Bilateral UE Support 2 x 10                         Time spent with patient reviewing proper muscle recruitment and technique with exercises. MANUAL THERAPY: (30 minutes): Joint mobilization and Soft tissue mobilization was utilized and necessary because of the patient's restricted joint motion, painful spasm, loss of articular motion and restricted motion of soft tissue   Soft Tissue Mobilization Medial and Lateral Hamstrings   Active Release Hamstrings, Adductors, Hip IR and ER   Hip IR/ER Log Roll Mobilization Grade II/III    MODALITIES: (0 minutes):      None Today     HEP: As above; handouts given to patient for all exercises. Treatment/Session Summary:    · Response to Treatment:  Patient reported no pain during or post treatment. She continues to have trace to mild medial hamstring tightness. She was able to progress with graded strengthening, but required rest. She would benefit from continued graded progression of strength to return to full prior level of function.    · Communication/Consultation:  Home Exercise Program  · Equipment provided today:  None today  · Recommendations/Intent for next treatment session: Next visit will focus on mobility, flexibility, range of motion, graded muscle activation and strength, and return to functional abilities. Please progress graded and per patient tolerance.     Total Treatment Billable Duration:  54 minutes  PT Patient Time In/Time Out  Time In: 1000  Time Out: 101 Richy Gan, PT

## 2021-05-06 ENCOUNTER — HOSPITAL ENCOUNTER (OUTPATIENT)
Dept: PHYSICAL THERAPY | Age: 81
Discharge: HOME | End: 2021-05-06
Payer: MEDICARE

## 2021-05-06 PROCEDURE — 97110 THERAPEUTIC EXERCISES: CPT

## 2021-05-06 PROCEDURE — 97140 MANUAL THERAPY 1/> REGIONS: CPT

## 2021-05-06 NOTE — PROGRESS NOTES
Fátima Jimenez  : 1940  [place]  Secondary: [place] [employer] [address] at [facility]  [address] [place], [address] [place] [address], [place], [address] [place] [address] Street  [phone]   [phone]      OUTPATIENT PHYSICAL THERAPY: Daily Treatment Note 2021    ICD-10: Treatment Diagnosis: Pain in right hip [M25.551]                Treatment Diagnosis 2: Generalized Muscle Weakness [M62.81]                Treatment Diagnosis 3: other abnormalities of gait and mobility [R26.89]  Precautions: Anemia, Cardiomegaly, GERD (gastroesophageal reflux disease), Hypercholesterolemia, Hypertension, Hyperthyroidism,  Multiple thyroid nodules, Osteoarthritis, Pancreatitis, and Type 2 diabetes mellitus  Allergies: Atorvastatin and Hydrocodone-acetaminophen   TREATMENT PLAN:  Effective Dates: 2021 TO 2021 (60 days). Frequency/Duration: 2 times a week for 60 Day(s) MEDICAL/REFERRING DIAGNOSIS:  Pain in right hip [M25.551]  Low back pain [M54.5]  Spondylosis without myelopathy or radiculopathy, lumbar region [M47.816]   DATE OF ONSET: 2020, slipped on dish soap on floor, fell and landed on floor  REFERRING PHYSICIAN: Alicia Tyler,*  MD Orders: Evaluate and Treat   Return MD Appointment: Patient is uncertain at this time. Pre-treatment Symptoms/Complaints:  Patient reports that she felt better after the manual interventions last session. She reports across the better feeling better. Pain: Initial: Pain Intensity 1: 2  Pain Location 1: Leg  Pain Orientation 1: Right, Posterior  Post Session:  0/10   Medications Last Reviewed:  2021  Updated Objective Findings:  Palpation: Right Medial and Lateral Hamstring Tightness, Tenderness Trigger Points, Medial greater then Lateral.   TREATMENT:   THERAPEUTIC EXERCISE: (24 minutes):  Exercises per grid below to improve mobility, strength, balance and coordination.   Required moderate visual, verbal, manual and tactile cues to promote proper body alignment, promote proper body posture, promote proper body mechanics and promote proper body breathing techniques. Progressed resistance, range, repetitions and complexity of movement as indicated. Date:  5/4/2021 Date:  5/6/2021 Date:  4/30/2021   Activity/Exercise Parameters Parameters Parameters   Posterior Pelvic Tilt   2 x 10   SKTC 5 sec x 20 5 x 10 R LE Therapist Assisted with Swiss Ball 5 sec x 20   Trunk Rotation Supine 3 x 10 Supine 3 x 10 Supine 3 x 10   Hip Abduction  Supine Hip Abduction Knee Fall Out Red 3 x 10 Seated 3 x 10 red    Hip Flexion   Seated March 3 x 10 Yellow   Calf Stretch   3 x 30 sec Slantboard   Hip and Knee Flexion/Extension      Knee Flexion   Yellow 3 x 10   Hamstring Curl 3 x 10 11.5 lbs 3 x 10 11.5 lbs    Trunk Flexion  3 x 10 with Swiss Ball 3 x 10 with [employer] UP and OVER FWD Bilateral UE Support 2 x 10     Knee Extension  3 x 10 11.5 lbs                  Time spent with patient reviewing proper muscle recruitment and technique with exercises. MANUAL THERAPY: (30 minutes): Joint mobilization and Soft tissue mobilization was utilized and necessary because of the patient's restricted joint motion, painful spasm, loss of articular motion and restricted motion of soft tissue   Soft Tissue Mobilization Medial and Lateral Hamstrings   Active Release Hamstrings, Adductors, Hip IR and ER   Hip IR/ER Log Roll Mobilization Grade II/III    MODALITIES: (0 minutes):      None Today     HEP: As above; handouts given to patient for all exercises. Treatment/Session Summary:    · Response to Treatment:  Patient demonstrated continued decreased hamstring tightness and tenderness. Patient reported no pain during or post treatment. Patient continues to demonstrated improved mobility, flexibility, range of motion. Patient also demonstrated improved strength and endurance.  Patient continues to have strength and functional deficits. She would benefit from continued graded progression of strength and stability to return to all functional abilities. · Communication/Consultation:  Home Exercise Program  · Equipment provided today:  None today  · Recommendations/Intent for next treatment session: Next visit will focus on mobility, flexibility, range of motion, graded muscle activation and strength, and return to functional abilities. Please progress graded and per patient tolerance.     Total Treatment Billable Duration:  54 minutes  PT Patient Time In/Time Out  Time In: 9810  Time Out: JAVIER Acuna 63, PT

## 2021-05-10 ENCOUNTER — HOSPITAL ENCOUNTER (OUTPATIENT)
Dept: PHYSICAL THERAPY | Age: 81
Discharge: HOME | End: 2021-05-10
Payer: MEDICARE

## 2021-05-10 PROCEDURE — 97110 THERAPEUTIC EXERCISES: CPT

## 2021-05-10 NOTE — PROGRESS NOTES
Denton Gomez  : 1940  [place]  Secondary: [place] [employer] [address] [facility]  [address] [facility], [address] [place] [address], [place], [address] [place] [address]  [phone]   [phone]      OUTPATIENT PHYSICAL THERAPY: Daily Treatment Note 5/10/2021    ICD-10: Treatment Diagnosis: Pain in right hip [M25.551]                Treatment Diagnosis 2: Generalized Muscle Weakness [M62.81]                Treatment Diagnosis 3: other abnormalities of gait and mobility [R26.89]  Precautions: Anemia, Cardiomegaly, GERD (gastroesophageal reflux disease), Hypercholesterolemia, Hypertension, Hyperthyroidism,  Multiple thyroid nodules, Osteoarthritis, Pancreatitis, and Type 2 diabetes mellitus  Allergies: Atorvastatin and Hydrocodone-acetaminophen   TREATMENT PLAN:  Effective Dates: 2021 TO 2021 (60 days). Frequency/Duration: 2 times a week for 60 Day(s) MEDICAL/REFERRING DIAGNOSIS:  Pain in right hip [M25.551]  Low back pain [M54.5]  Spondylosis without myelopathy or radiculopathy, lumbar region [M47.816]   DATE OF ONSET: 2020, slipped on dish soap on floor, fell and landed on floor  REFERRING PHYSICIAN: Trenton Lazo,*  MD Orders: Evaluate and Treat   Return MD Appointment: Patient is uncertain at this time. Pre-treatment Symptoms/Complaints:  Patient reports that she is feeling better, no pain. She reports that she feels ready to continue on her own with her HEP. Pain: Initial: Pain Intensity 1: 0  Pain Location 1: Leg  Pain Orientation 1: Right, Posterior  Post Session:  0/10   Medications Last Reviewed:  5/10/2021  Updated Objective Findings:  See evaluation note from today    TREATMENT:   THERAPEUTIC EXERCISE: (53 minutes):  Exercises per grid below to improve mobility, strength, balance and coordination.   Required moderate visual, verbal, manual and tactile cues to promote proper body alignment, promote proper body posture, promote proper body mechanics and promote proper body breathing techniques. Progressed resistance, range, repetitions and complexity of movement as indicated. Date:  5/4/2021 Date:  5/6/2021 Date:  5/10/2021   Activity/Exercise Parameters Parameters Parameters   Posterior Pelvic Tilt   3 x 10   SKTC 5 sec x 20 5 x 10 R LE Therapist Assisted with Swiss Ball 6 x 30 sec Bilaterally   Trunk Rotation Supine 3 x 10 Supine 3 x 10 Supine 3 x 10   Hip Abduction  Supine Hip Abduction Knee Fall Out Red 3 x 10 Supine Hip Abduction Knee Fall Out Red 3 x 10    Double Leg 3 x 10 Yellow   Hip Flexion   Seated March 3 x 10 Yellow   Calf Stretch   10 x 10 sec Standing at Wall   Hip and Knee Flexion/Extension      Knee Flexion   Yellow 3 x 10   Hamstring Curl 3 x 10 11.5 lbs 3 x 10 11.5 lbs Seated Yellow 3 x 10 Each Leg   Trunk Flexion  3 x 10 with Swiss Ball 3 x 10 with Swiss [employer] UP and OVER FWD Bilateral UE Support 2 x 10     Knee Extension  3 x 10 11.5 lbs                  Time spent with patient reviewing proper muscle recruitment and technique with exercises. MANUAL THERAPY: (0 minutes): Joint mobilization and Soft tissue mobilization was utilized and necessary because of the patient's restricted joint motion, painful spasm, loss of articular motion and restricted motion of soft tissue   Not Today    MODALITIES: (0 minutes):      None Today     HEP: As above; handouts given to patient for all exercises. Treatment/Session Summary:    · Response to Treatment:  Patient reported no pain with treatment. She has demonstrated improved mobility, flexibility, range of motion, muscle activation, strength, balance, stability. She will be discharged at this time to her [facility]. Her HEP was reviewed and progressed. · Communication/Consultation:  Home Exercise Program  · Equipment provided today:  Red and Yellow Theraband  · Recommendations: Discharge to [facility].     Total Treatment Billable Duration:  53 minutes  PT Patient Time In/Time Out  Time In: 0959  Time Out: 1500 [facility], PT

## 2021-05-10 NOTE — THERAPY DISCHARGE
Heber Tran : 1940 [place] Secondary: [place] [employer] [address] [facility] [place] [id number] [facility], [address] [place] [address], Barrington peterson, [address] [place] [address] [phone]   [phone] OUTPATIENT PHYSICAL THERAPY:Progress Report and Discharge 5/10/2021 ICD-10: Treatment Diagnosis: Pain in right hip [M25.551] Treatment Diagnosis 2: Generalized Muscle Weakness [M62.81] Treatment Diagnosis 3: other abnormalities of gait and mobility [R26.89] Precautions: Anemia, Cardiomegaly, GERD (gastroesophageal reflux disease), Hypercholesterolemia, Hypertension, Hyperthyroidism,  Multiple thyroid nodules, Osteoarthritis, Pancreatitis, and Type 2 diabetes mellitus Allergies: Atorvastatin and Hydrocodone-acetaminophen TREATMENT PLAN: 
Effective Dates: 2021 TO 2021 (60 days). Frequency/Duration: 2 times a week for 60 Day(s) MEDICAL/REFERRING DIAGNOSIS: 
Pain in right hip [M25.551] Low back pain [M54.5] Spondylosis without myelopathy or radiculopathy, lumbar region [M47.816] DATE OF ONSET: 2020, slipped on dish soap on floor, fell and landed on floor REFERRING PHYSICIAN: Saintclair Largo MD Orders: Evaluate and Treat Return MD Appointment: Patient is uncertain at this time. INITIAL ASSESSMENT:  Ms. John Ordaz is a 78 y.o. female presenting to physical therapy with complaints of right posterior thigh pain, tightness, difficulty walking, standing, sitting. She reports that on 2020 she slipped on dish soap on floor, fell. She reports that since fall her pain has progressively gotten worse. She reports having an X-Ray and LISETTE that per patient showed strained hamstring. She reports is has trouble sitting, standing, walking and is taking longer and having more difficulty dressing, bathing. She reports unable to clean, vacuum, mop.  She reports having to have to  assist with these activities. Patient reports only getting any real relief with lying down. She reports trying ice and heat, but no real lasting help. Patient presents with increased pain, decreased strength, decreased ROM, decreased flexibility, impaired gait, impaired posture, impaired overhead reach, impaired transfer ability, decreased activity tolerance, and overall impaired functional mobility. Patient is a good candidate for skilled physical therapy interventions to include manual therapy, therapeutic exercise, balance training, gait training, transfer training, postural re-education, body mechanics training, and pain modalities as needed. PROGRESS AND DISCHARGE REPORT:  Ms. Randi Betancourt is a 78 y.o. female who has demonstrated significantly decreased pain and improved function. She has reported significantly decreased pain and improved sleep and function. Patient has been seen for 9 sessions of physical therapy from 4/14/2021 to 5/10/2021. She reports feeling % better with all daily activities including sleeping. She has demonstrated improved range of motion, mobility, flexibility, strength, balance, gait, and function. She will be discharged at this time to her HEP. PROBLEM LIST (Impacting functional limitations): 1. Decreased Strength 2. Decreased ADL/Functional Activities 3. Decreased Transfer Abilities 4. Decreased Ambulation Ability/Technique 5. Decreased Balance 6. Increased Pain 7. Decreased Activity Tolerance 8. Decreased Pacing Skills 9. Decreased Work Simplification/Energy Conservation Techniques 10. Decreased Flexibility/Joint Mobility 11. Edema/Girth 12. Decreased Knowledge of Precautions 13. Decreased Shannon with Home Exercise Program INTERVENTIONS PLANNED: (Treatment may consist of any combination of the following) 1. Balance Exercise 2. Bed Mobility 3. Cryotherapy 4. Electrical Stimulation 5. Gait Training 6. Heat 7. Home Exercise Program (HEP) 8. Manual Therapy 9. Neuromuscular Re-education/Strengthening 10. Range of Motion (ROM) 11. Therapeutic Exercise/Strengthening 12. Transcutaneous Electrical Nerve Stimulation (TENS) 13. Ultrasound (US) 14. Trigger Point Dry Needling, (TPDN) GOALS: (Goals have been discussed and agreed upon with patient.) Short-Term Functional Goals: Time Frame: 4/14/2021 to 5/13/2021 1. Patient demonstrates independence with home exercise program without verbal cueing provided by therapist. -MET 2. Patient will report no more than 2/10 right hamstring and hip pain at rest in order to demonstrate improved self pain control and tolerance with sitting, standing, walking. -MET 3. Patient will be educated in and demonstrate proper squat lift technique in order to reduce stress on right hip and hamstring , improve safety, and reduce risk of injury. -MET 4. Patient will improve hip flexion to 90 degrees to assist with improved sitting ability and tolerance to assist with eating and dressing, bathing. -MET 5. Patient will improve strength to 4/5 to assist with standing tolerance for prolonged periods. -MET Discharge Goals: Time Frame: 4/14/2021 to 6/13/2021 1. Patient will improve hip flexion to 110 degrees to assist with return to no difficulty with dressing and bathing with improved safety. -MET 2. Patient will improve strength to 5/5 to be able to return to independent mopping, vacuuming, and cleaning. -MET 3. Patient will improve single leg standing to 8 seconds or greater to assist with ascending and descending stairs, steps, curbs. -MET 4. Patient will improve Lower Extremity Functional Scale score to 38/80 from 18/80. -MET Outcome Measure: Tool Used: Lower Extremity Functional Scale (LEFS) Score:  Initial: 18/80 Most Recent: 78/80 (Date: 5/10/2021 ) Interpretation of Score: 20 questions each scored on a 5 point scale with 0 representing \"extreme difficulty or unable to perform\" and 4 representing \"no difficulty\".   The lower the score, the greater the functional disability. 80/80 represents no disability. Minimal detectable change is 9 points. Tool Used: Modified Oswestry Low Back Pain Questionnaire Score:  Initial: 25/50  Most Recent: 0/50 (Date: 5/10/2021 ) Interpretation of Score: Each section is scored on a 0-5 scale, 5 representing the greatest disability. The scores of each section are added together for a total score of 50. UPDATED OBJECTIVE MEASURES: 
Patient denies any LE paresthesia. Patient denies any increase of symptoms with cough, sneeze or valsalva. Patient denies any saddle paresthesia or bowel/bladder deficits. Observation/Orthostatic Postural Assessment:   
      Patient demonstrated no significant deviations. Palpation:   
      Patient demonstrated trace to no tightness, tenderness, trigger points, right medial and lateral hamstrings, gluteal lisa/medius/minimus. ROM:   
       
AROM/ PROM Left (degrees) Right (degrees) Hip Flexion 100 115 (from 84) Hip External Rotation (ER) 48 40 (from 15) Hip Internal Rotation (IR) 35 39 (from 30) Hip Extension 0 (from -10) 0 (from -10) Hip Abduction 40 34 (from 22) Knee Flexion 100 115 Knee Extension -10 -2 Strength: Motion Tested Left   (*/5) Right  (*/5) Knee Extension 5 (from 3) 5 (from 3) Knee Flexion 5 (from 3) 5 (from 3) Hip Flexion 5 (from 4) 5 (from 3) Hip Extension 5 (from 2) 5 (from 2) Hip Abduction 5 (from 2) 5 (from 2) Ankle DF 5 (from 4) 5 (from 3) Special Tests:   
     Negative Right 75 degrees (from Positive Right SLR 27 degrees Hamstring Tightness), Left 70 degrees Passive Accessory Motion:  
      Lumbar Hypomobility, Right Hip Hypomobility All Directions Neurological Screen: Myotomes: Key muscle strength testing through bilateral LE is intact, deficits noted above. Dermatomes: Sensation to light touch for bilateral LE is intact from intact L3 to S1.  
 Reflexes: Patellar (L3/ L4): Intact Achilles (S1/ S2): Intact Functional Mobility:  
      Gait/Ambulation:  Patient demonstrated trace decreased right hip flexion and extension, decreased right LE weight bearing. Patient ambulated with trace antalgic gait pattern. Transfers:  Patient required use of UE intermittently. Balance:   
      Single Leg Balance: 8 seconds Bilaterally (from Right Unable secondary to pain. Left Unable) Reason for Services/Other Comments: 
· Patient achieved all established goals and will be discharged at this time to her HEP. [facility] Potential For Stated Goals: Good Regarding Sil Hansen's therapy, I certify that the treatment plan above will be carried out by a therapist or under their direction. Thank you for this referral, 
Rosalba Jimenez, PT, DPT, TPS Referring Physician Signature: Vianca Madrigal,* No Signature is Required for this note.

## 2021-08-11 PROBLEM — E11.22 TYPE 2 DIABETES MELLITUS WITH CHRONIC KIDNEY DISEASE (HCC): Status: ACTIVE | Noted: 2018-02-21

## 2022-03-18 PROBLEM — E11.22 TYPE 2 DIABETES MELLITUS WITH CHRONIC KIDNEY DISEASE (HCC): Status: ACTIVE | Noted: 2018-02-21

## 2022-03-19 PROBLEM — N18.30 CKD (CHRONIC KIDNEY DISEASE) STAGE 3, GFR 30-59 ML/MIN (HCC): Status: ACTIVE | Noted: 2019-12-16

## 2022-03-19 PROBLEM — M17.0 PRIMARY OSTEOARTHRITIS OF BOTH KNEES: Status: ACTIVE | Noted: 2017-11-21

## 2022-06-15 DIAGNOSIS — E05.90 HYPERTHYROIDISM: Primary | ICD-10-CM

## 2022-08-10 DIAGNOSIS — E78.00 HYPERCHOLESTEROLEMIA: ICD-10-CM

## 2022-08-10 DIAGNOSIS — E11.22 TYPE 2 DIABETES MELLITUS WITH STAGE 3A CHRONIC KIDNEY DISEASE, WITH LONG-TERM CURRENT USE OF INSULIN (HCC): ICD-10-CM

## 2022-08-10 DIAGNOSIS — Z79.4 TYPE 2 DIABETES MELLITUS WITH STAGE 3A CHRONIC KIDNEY DISEASE, WITH LONG-TERM CURRENT USE OF INSULIN (HCC): ICD-10-CM

## 2022-08-10 DIAGNOSIS — E05.90 HYPERTHYROIDISM: ICD-10-CM

## 2022-08-10 DIAGNOSIS — I10 ESSENTIAL HYPERTENSION WITH GOAL BLOOD PRESSURE LESS THAN 140/90: ICD-10-CM

## 2022-08-10 DIAGNOSIS — I10 ESSENTIAL HYPERTENSION WITH GOAL BLOOD PRESSURE LESS THAN 140/90: Primary | ICD-10-CM

## 2022-08-10 DIAGNOSIS — N18.31 TYPE 2 DIABETES MELLITUS WITH STAGE 3A CHRONIC KIDNEY DISEASE, WITH LONG-TERM CURRENT USE OF INSULIN (HCC): ICD-10-CM

## 2022-08-10 LAB
ALBUMIN SERPL-MCNC: 3.8 G/DL (ref 3.2–4.6)
ALBUMIN/GLOB SERPL: 1 {RATIO} (ref 1.2–3.5)
ALP SERPL-CCNC: 66 U/L (ref 50–136)
ALT SERPL-CCNC: 27 U/L (ref 12–65)
ANION GAP SERPL CALC-SCNC: 6 MMOL/L (ref 7–16)
AST SERPL-CCNC: 20 U/L (ref 15–37)
BASOPHILS # BLD: 0.1 K/UL (ref 0–0.2)
BASOPHILS NFR BLD: 1 % (ref 0–2)
BILIRUB SERPL-MCNC: 0.3 MG/DL (ref 0.2–1.1)
BUN SERPL-MCNC: 31 MG/DL (ref 8–23)
CALCIUM SERPL-MCNC: 9.6 MG/DL (ref 8.3–10.4)
CHLORIDE SERPL-SCNC: 105 MMOL/L (ref 98–107)
CHOLEST SERPL-MCNC: 154 MG/DL
CO2 SERPL-SCNC: 27 MMOL/L (ref 21–32)
CREAT SERPL-MCNC: 1.6 MG/DL (ref 0.6–1)
DIFFERENTIAL METHOD BLD: ABNORMAL
EOSINOPHIL # BLD: 0.1 K/UL (ref 0–0.8)
EOSINOPHIL NFR BLD: 2 % (ref 0.5–7.8)
ERYTHROCYTE [DISTWIDTH] IN BLOOD BY AUTOMATED COUNT: 15 % (ref 11.9–14.6)
EST. AVERAGE GLUCOSE BLD GHB EST-MCNC: 151 MG/DL
GLOBULIN SER CALC-MCNC: 3.8 G/DL (ref 2.3–3.5)
GLUCOSE SERPL-MCNC: 159 MG/DL (ref 65–100)
HBA1C MFR BLD: 6.9 % (ref 4.8–5.6)
HCT VFR BLD AUTO: 31.6 % (ref 35.8–46.3)
HDLC SERPL-MCNC: 52 MG/DL (ref 40–60)
HDLC SERPL: 3 {RATIO}
HGB BLD-MCNC: 9.7 G/DL (ref 11.7–15.4)
IMM GRANULOCYTES # BLD AUTO: 0 K/UL (ref 0–0.5)
IMM GRANULOCYTES NFR BLD AUTO: 0 % (ref 0–5)
LDLC SERPL CALC-MCNC: 78.8 MG/DL
LYMPHOCYTES # BLD: 2 K/UL (ref 0.5–4.6)
LYMPHOCYTES NFR BLD: 34 % (ref 13–44)
MCH RBC QN AUTO: 26.9 PG (ref 26.1–32.9)
MCHC RBC AUTO-ENTMCNC: 30.7 G/DL (ref 31.4–35)
MCV RBC AUTO: 87.8 FL (ref 79.6–97.8)
MONOCYTES # BLD: 0.5 K/UL (ref 0.1–1.3)
MONOCYTES NFR BLD: 8 % (ref 4–12)
NEUTS SEG # BLD: 3.2 K/UL (ref 1.7–8.2)
NEUTS SEG NFR BLD: 55 % (ref 43–78)
NRBC # BLD: 0 K/UL (ref 0–0.2)
PLATELET # BLD AUTO: 274 K/UL (ref 150–450)
PMV BLD AUTO: 9.6 FL (ref 9.4–12.3)
POTASSIUM SERPL-SCNC: 4.2 MMOL/L (ref 3.5–5.1)
PROT SERPL-MCNC: 7.6 G/DL (ref 6.3–8.2)
RBC # BLD AUTO: 3.6 M/UL (ref 4.05–5.2)
SODIUM SERPL-SCNC: 138 MMOL/L (ref 136–145)
TRIGL SERPL-MCNC: 116 MG/DL (ref 35–150)
TSH, 3RD GENERATION: 2.29 UIU/ML (ref 0.36–3.74)
VLDLC SERPL CALC-MCNC: 23.2 MG/DL (ref 6–23)
WBC # BLD AUTO: 5.9 K/UL (ref 4.3–11.1)

## 2022-08-17 ENCOUNTER — OFFICE VISIT (OUTPATIENT)
Dept: PRIMARY CARE CLINIC | Facility: CLINIC | Age: 82
End: 2022-08-17
Payer: MEDICARE

## 2022-08-17 VITALS
SYSTOLIC BLOOD PRESSURE: 160 MMHG | TEMPERATURE: 97.3 F | DIASTOLIC BLOOD PRESSURE: 53 MMHG | HEART RATE: 77 BPM | BODY MASS INDEX: 29.77 KG/M2 | WEIGHT: 168 LBS | HEIGHT: 63 IN | OXYGEN SATURATION: 100 %

## 2022-08-17 DIAGNOSIS — N18.32 TYPE 2 DIABETES MELLITUS WITH STAGE 3B CHRONIC KIDNEY DISEASE, WITHOUT LONG-TERM CURRENT USE OF INSULIN (HCC): Primary | ICD-10-CM

## 2022-08-17 DIAGNOSIS — E34.2 ECTOPIC HORMONE SECRETION, NOT ELSEWHERE CLASSIFIED: ICD-10-CM

## 2022-08-17 DIAGNOSIS — D63.1 ANEMIA DUE TO STAGE 3B CHRONIC KIDNEY DISEASE (HCC): ICD-10-CM

## 2022-08-17 DIAGNOSIS — N18.32 ANEMIA DUE TO STAGE 3B CHRONIC KIDNEY DISEASE (HCC): ICD-10-CM

## 2022-08-17 DIAGNOSIS — N18.31 STAGE 3A CHRONIC KIDNEY DISEASE (HCC): ICD-10-CM

## 2022-08-17 DIAGNOSIS — E78.00 HYPERCHOLESTEROLEMIA: ICD-10-CM

## 2022-08-17 DIAGNOSIS — I10 ESSENTIAL HYPERTENSION WITH GOAL BLOOD PRESSURE LESS THAN 140/90: ICD-10-CM

## 2022-08-17 DIAGNOSIS — Z13.820 ENCOUNTER FOR SCREENING FOR OSTEOPOROSIS: ICD-10-CM

## 2022-08-17 DIAGNOSIS — E11.22 TYPE 2 DIABETES MELLITUS WITH STAGE 3B CHRONIC KIDNEY DISEASE, WITHOUT LONG-TERM CURRENT USE OF INSULIN (HCC): Primary | ICD-10-CM

## 2022-08-17 DIAGNOSIS — E04.2 MULTIPLE THYROID NODULES: ICD-10-CM

## 2022-08-17 DIAGNOSIS — E05.90 HYPERTHYROIDISM: ICD-10-CM

## 2022-08-17 PROCEDURE — 1123F ACP DISCUSS/DSCN MKR DOCD: CPT | Performed by: FAMILY MEDICINE

## 2022-08-17 PROCEDURE — 3044F HG A1C LEVEL LT 7.0%: CPT | Performed by: FAMILY MEDICINE

## 2022-08-17 PROCEDURE — 99214 OFFICE O/P EST MOD 30 MIN: CPT | Performed by: FAMILY MEDICINE

## 2022-08-17 ASSESSMENT — PATIENT HEALTH QUESTIONNAIRE - PHQ9
SUM OF ALL RESPONSES TO PHQ QUESTIONS 1-9: 0
SUM OF ALL RESPONSES TO PHQ9 QUESTIONS 1 & 2: 0
SUM OF ALL RESPONSES TO PHQ QUESTIONS 1-9: 0
2. FEELING DOWN, DEPRESSED OR HOPELESS: 0
SUM OF ALL RESPONSES TO PHQ QUESTIONS 1-9: 0
SUM OF ALL RESPONSES TO PHQ QUESTIONS 1-9: 0
1. LITTLE INTEREST OR PLEASURE IN DOING THINGS: 0

## 2022-08-17 NOTE — PROGRESS NOTES
[facility]  Opal Quinteros M.D.  [address]  [place]  Ph No:  [phone]  Fax:  [phone]:  Chief Complaint   Patient presents with    Discuss Labs     Patient is here to go over lab results. She has no concerns today. Hypertension     Patient is here to follow up to hypertension she took her medication around 8 am.          IMPRESSION/PLAN    1. Type 2 diabetes mellitus with stage 3b chronic kidney disease, without long-term current use of insulin ([facility]  2. Essential hypertension with goal blood pressure less than 140/90  3. Hyperthyroidism  4. Anemia due to stage 3b chronic kidney disease ([facility]  5. Multiple thyroid nodules  6. Stage 3a [facility] ([facility]  7. Hypercholesterolemia  8. Encounter for screening for osteoporosis  -     DEXA BONE DENSITY AXIAL SKELETON; Future  9. Ectopic hormone secretion, not elsewhere classified   -     DEXA BONE DENSITY AXIAL SKELETON; Future      Diabetes is Well-controlled, continue current medications, medication adherence emphasized, and lifestyle modifications recommended  Discussed the goal of A1C control will be less than 7. Goal for fasting glucose should be between 80 and 130. The goal for 2 hour post meal glucose should be less than 180. Call if any blood glucose readings are less than 80 or if any readings are higher than 300.     Key Antihyperglycemic Medications            glipiZIDE (GLUCOTROL XL) 10 MG extended release tablet (Taking)    Class: Historical Med    metFORMIN (GLUCOPHAGE) 500 MG tablet (Taking)    Class: Historical Med          Blood pressure currently  Well-controlled, continue current medications, medication adherence emphasized, and lifestyle modifications recommended    Key Hyperlipidemia Meds            rosuvastatin (CRESTOR) 20 MG tablet (Taking)    Class: Historical Med          Key Anti-Platelet Anticoag Meds by Subclass       Salicylates       aspirin 81 MG EC tablet Take 81 mg by mouth           Bp at home is 140's/80's. She will continue to monitor at home and bring her device for comparison when she returns. Continue to fu with endo or management of thyroid disease. Has chk.  Creatinine has increased. Likely form combination of htn and lisinopril. Recheck in 3 mos. Schedule bone density. We discussed the expected course, resolution and complications of the diagnosis(es) in detail. Medication risks, benefits, costs, interactions, and alternatives were discussed as indicated. I advised pt to contact the office if condition worsens, changes or fails to improve as anticipated. Pt expressed understanding with the diagnosis(es) and plan. HISTORY OF PRESENT ILLNESS:  This patient is here to follow-up on diabetes. A1c is well controlled at 6.9. Patient has tolerating medications well. Side effects include none. Patient denies symptoms of hyper or hypoglycemia. Patient denies hypoglycemic episodes. Patient denies vision changes. Patient does check feet daily for cuts lacerations or abrasions. Patient does follow a diabetic diet. Patient does not have an exercise routine daily. Patient denies chest pain or shortness of breath. Patient denies nausea vomiting or diarrhea. Patient denies abdominal pain. Patient admits to symptoms of peripheral neuropathy including pain, numbness, and tingling. Joann Grant is also here for follow-up of hypertension. she indicates that she is feeling well and denies any symptoms referable to his hypertension. she is not exercising and is adherent to low salt diet. Blood pressure is well controlled at home. Use of agents associated with hypertension: none. denies chest pain, shortness of breath. denies headaches or blurred vision          REVIEW OF SYSTEMS:  Review of Systems    Review of systems is as stated above, otherwise is negative.     PHYSICAL EXAM:  Vital Signs - BP (!) 160/53 (Site: Left Upper Arm, Position: Sitting, Cuff Size: Small Adult)   Pulse 77   Temp 97.3 °F (36.3 °C) (Temporal)   Ht 5' 3\" (1.6 m)   Wt 168 lb (76.2 kg)   SpO2 100%   BMI 29.76 kg/m²      Physical Exam  Constitutional:       Appearance: Normal appearance. Eyes:      Extraocular Movements: Extraocular movements intact. Pupils: Pupils are equal, round, and reactive to light. Cardiovascular:      Rate and Rhythm: Normal rate and regular rhythm. Pulmonary:      Effort: Pulmonary effort is normal.      Breath sounds: Normal breath sounds. Musculoskeletal:         General: Normal range of motion. Cervical back: Normal range of motion. Neurological:      General: No focal deficit present. Mental Status: She is alert and oriented to person, place, and time. Psychiatric:         Mood and Affect: Mood normal.         Behavior: Behavior normal.            Opal Mota MD            Dictated using voice recognition software.  Proofread, but unrecognized voice recognition errors may exist.

## 2022-10-10 ENCOUNTER — HOSPITAL ENCOUNTER (OUTPATIENT)
Dept: MAMMOGRAPHY | Age: 82
Discharge: HOME | End: 2022-10-13
Payer: MEDICARE

## 2022-10-10 DIAGNOSIS — Z13.820 ENCOUNTER FOR SCREENING FOR OSTEOPOROSIS: ICD-10-CM

## 2022-10-10 DIAGNOSIS — E34.2 ECTOPIC HORMONE SECRETION, NOT ELSEWHERE CLASSIFIED: ICD-10-CM

## 2022-10-10 PROCEDURE — 77080 DXA BONE DENSITY AXIAL: CPT

## 2022-11-02 ENCOUNTER — OFFICE VISIT (OUTPATIENT)
Dept: ENDOCRINOLOGY | Age: 82
End: 2022-11-02
Payer: MEDICARE

## 2022-11-02 VITALS
SYSTOLIC BLOOD PRESSURE: 168 MMHG | BODY MASS INDEX: 29.41 KG/M2 | DIASTOLIC BLOOD PRESSURE: 70 MMHG | OXYGEN SATURATION: 96 % | HEART RATE: 90 BPM | HEIGHT: 63 IN | WEIGHT: 166 LBS

## 2022-11-02 DIAGNOSIS — E11.22 TYPE 2 DIABETES MELLITUS WITH STAGE 3B CHRONIC KIDNEY DISEASE, WITHOUT LONG-TERM CURRENT USE OF INSULIN (HCC): ICD-10-CM

## 2022-11-02 DIAGNOSIS — E04.2 MULTIPLE THYROID NODULES: ICD-10-CM

## 2022-11-02 DIAGNOSIS — E05.90 HYPERTHYROIDISM: Primary | ICD-10-CM

## 2022-11-02 DIAGNOSIS — E05.90 HYPERTHYROIDISM: ICD-10-CM

## 2022-11-02 DIAGNOSIS — N18.32 TYPE 2 DIABETES MELLITUS WITH STAGE 3B CHRONIC KIDNEY DISEASE, WITHOUT LONG-TERM CURRENT USE OF INSULIN (HCC): ICD-10-CM

## 2022-11-02 LAB
ALBUMIN SERPL-MCNC: 3.8 G/DL (ref 3.2–4.6)
ALBUMIN/GLOB SERPL: 1.2 {RATIO} (ref 0.4–1.6)
ALP SERPL-CCNC: 68 U/L (ref 50–136)
ALT SERPL-CCNC: 21 U/L (ref 12–65)
AST SERPL-CCNC: 16 U/L (ref 15–37)
BILIRUB DIRECT SERPL-MCNC: <0.1 MG/DL
BILIRUB SERPL-MCNC: 0.3 MG/DL (ref 0.2–1.1)
GLOBULIN SER CALC-MCNC: 3.3 G/DL (ref 2.8–4.5)
PROT SERPL-MCNC: 7.1 G/DL (ref 6.3–8.2)
T3 SERPL-MCNC: 1.03 NG/ML (ref 0.6–1.81)
T4 FREE SERPL-MCNC: 0.9 NG/DL (ref 0.78–1.46)
TSH, 3RD GENERATION: 1.45 UIU/ML (ref 0.36–3.74)

## 2022-11-02 PROCEDURE — 1036F TOBACCO NON-USER: CPT | Performed by: INTERNAL MEDICINE

## 2022-11-02 PROCEDURE — 3078F DIAST BP <80 MM HG: CPT | Performed by: INTERNAL MEDICINE

## 2022-11-02 PROCEDURE — G8399 PT W/DXA RESULTS DOCUMENT: HCPCS | Performed by: INTERNAL MEDICINE

## 2022-11-02 PROCEDURE — G8417 CALC BMI ABV UP PARAM F/U: HCPCS | Performed by: INTERNAL MEDICINE

## 2022-11-02 PROCEDURE — G8484 FLU IMMUNIZE NO ADMIN: HCPCS | Performed by: INTERNAL MEDICINE

## 2022-11-02 PROCEDURE — 1123F ACP DISCUSS/DSCN MKR DOCD: CPT | Performed by: INTERNAL MEDICINE

## 2022-11-02 PROCEDURE — 3075F SYST BP GE 130 - 139MM HG: CPT | Performed by: INTERNAL MEDICINE

## 2022-11-02 PROCEDURE — 3044F HG A1C LEVEL LT 7.0%: CPT | Performed by: INTERNAL MEDICINE

## 2022-11-02 PROCEDURE — G8427 DOCREV CUR MEDS BY ELIG CLIN: HCPCS | Performed by: INTERNAL MEDICINE

## 2022-11-02 PROCEDURE — 1090F PRES/ABSN URINE INCON ASSESS: CPT | Performed by: INTERNAL MEDICINE

## 2022-11-02 PROCEDURE — 99213 OFFICE O/P EST LOW 20 MIN: CPT | Performed by: INTERNAL MEDICINE

## 2022-11-02 RX ORDER — METHIMAZOLE 5 MG/1
5 TABLET ORAL DAILY
Qty: 90 TABLET | Refills: 3
Start: 2022-11-02 | End: 2022-11-02 | Stop reason: SDUPTHER

## 2022-11-02 RX ORDER — METHIMAZOLE 5 MG/1
5 TABLET ORAL DAILY
Qty: 90 TABLET | Refills: 3 | Status: SHIPPED | OUTPATIENT
Start: 2022-11-02

## 2022-11-02 ASSESSMENT — ENCOUNTER SYMPTOMS
CONSTIPATION: 0
DIARRHEA: 0

## 2022-11-02 NOTE — PROGRESS NOTES
Please let Ms. Glenn Ritchie know that her thyroid levels look great. I sent a methimazole prescription to her pharmacy. Continue the same dose.

## 2022-11-02 NOTE — PROGRESS NOTES
Lakhwinder Rosas MD, [address]            Reason for visit: Follow-up of hyperthyroidism      ASSESSMENT AND PLAN:    1. Hyperthyroidism  She did not check requested labs prior to today's appointment. I will have her do so today. - methIMAzole (TAPAZOLE) 5 MG tablet; Take 1 tablet by mouth daily  Dispense: 90 tablet; Refill: 3  - TSH; Future  - T4, Free; Future  - T3; Future  - Hepatic Function Panel; Future  - TSH; Future  - T4, Free; Future  - T3; Future  - Hepatic Function Panel; Future    2. Multiple thyroid nodules  Ongoing ultrasound follow-up is not necessary. 3. Type 2 diabetes mellitus with stage 3b chronic kidney disease, without long-term current use of insulin (HCC)  Defer to Dr. Calvin Michelle. Follow-up and Dispositions    Return in about 6 months (around 2023). History of Present Illness:    THYROID NODULES  Ron Donis is seen today in the [place] [address] for follow-up of thyroid nodules and hyperthyroidism. These were discovered as part of the evaluation of unintentional weight loss (she has now regained the lost weight). There is not a history of radiation to the head/neck. The patient does not have a family history of thyroid cancer. Symptoms: See review of systems below     Prior imagin2016: Iodine 123 uptake and scan ([facility])- 24 hour uptake 19%. Scan demonstrates asymmetric enlargement of the right lobe with a symmetrically increased uptake in the right lobe. 2016: Ultrasound ([facility])- Right lobe 3.4 x 2.1 x 2.4 cm, isthmus 0.2 cm, left lobe 3.3 x 1.4 x 1.8 cm.  2.1 cm complex nodule in the right lobe. 1.5 cm complex nodule in the left lobe. 3/10/2017: Ultrasound ([facility])- Right lobe 2.14 x 2.02 x 4.78 cm, isthmus 0.21 cm, left lobe 1.18 x 1.37 x 3.13 cm. Heterogeneous echotexture. Multiple nodules in both lobes.   The largest are as follows: 1.61 x 1.60 x 1.73 cm anechoic colloid cyst at the right upper pole. 0.98 x 1.48 x 1.76 cm complex hypoechoic colloid nodule with increased blood flow but with no calcifications in the upper to midportion of the right lobe. 1.45 x 2.13 x 1.66 cm complex isoechoic colloid nodule with increased blood flow but with no calcifications at the right lower pole. 0.82 x 0.91 x 1.49 cm complex isoechoic nodule without calcifications or increased blood flow at the left lower pole. 3/8/2018: Ultrasound ([facility])- Right lobe 2.37 x 2.70 x 5.27 cm, isthmus 0.15 cm, left lobe 1.43 x 1.56 x 3.78 cm. Heterogeneous echotexture. Multiple nodules in both lobes. The largest are as follows: 0.79 x 0.80 x 0.92 cm anechoic colloid cyst at the right upper pole. 0.73 x 1.33 x 1.57 cm complex hypoechoic colloid nodule without calcifications or increased blood flow in the upper to midportion of the right lobe. 1.47 x 1.93 x 2.31 cm complex isoechoic nodule with increased peripheral but not central blood flow and no calcifications at the right lower pole. 0.83 x 0.81 x 1.56 cm complex (approximately 50% cystic) isoechoic nodule without calcifications or increased blood flow in the mid to lower portion of the left lobe. 1/16/2020: Ultrasound ([facility])- Right lobe 2.00 x 2.54 x 4.97 cm, isthmus 0.28 cm, left lobe 1.33 x 1.43 x 3.82 cm. Heterogeneous echotexture. Normal blood flow. Multiple coalescent nodules in both lobes and the isthmus. The largest are as follows: 0.78 x 1.16 x 1.42 cm isoechoic spongiform colloid nodule without calcifications or increased blood flow at the right upper pole. 1.74 x 2.11 x 2.29 cm complex isoechoic nodule without calcifications or increased blood flow at the right lower pole. 0.92 x 1.21 x 1.31 cm complex isoechoic nodule without calcifications or increased blood flow in the mid to lower portion of the left lobe.      10/28/2021: Ultrasound ([facility])- Right lobe 2.56 x 2.52 x 4.51 cm, isthmus 0.38 cm, left lobe 1.47 x 1.50 x 4.14 cm. Heterogeneous echotexture. Normal blood flow. 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 The largest are as follows: 0.99 x 1.34 x 1.33 cm isoechoic spongiform colloid nodule without calcifications or increased blood flow at the right upper pole. 1.74 x 1.85 x 2.24 cm complex isoechoic nodule without calcifications or increased blood flow at the right lower pole. 0.91 x 1.00 x 1.36 cm complex isoechoic nodule without calcifications or increased blood flow in the mid to lower portion of the left lobe. Prior laboratory evaluation:  3/25/2014: TSH 1.850.  5/4/2016: TSH 0.312.  7/28/2016: TSH 0.118, free T4 1.1, T3 1.49.  9/6/2016: TSH 1.360, free T4 0.88, T3 120.  12/7/2016: TSH 2.040, free T4 0.8, T3 1.21.  3/7/2017: TSH 1.420, free T4 0.9, T3 1.22.  7/10/2017: TSH 0.656, free T4 1.0, T3 1.17.  11/10/2017: TSH 1.120, free T4 1.0, T3 1.22.  2/16/2018: TSH 1.300.  9/10/2018: TSH 0.797, free T4 1.07, T3 126.  5/20/2019: TSH 0.786, free T4 1.0, T3 1.11.  6/4/2019: TSH 0.991.  12/3/2019: TSH 1.070.  6/3/2020: TSH 0.637.  7/21/2020: TSH 0.341, free T4 1.0, T3 1.30.  1/27/2021: TSH 0.173, free T4 1.10, T3 140.  4/22/2021: TSH 2.580, free T4 0.87, T3 117.  8/4/2021: TSH 1.920.  10/20/2021: TSH 3.100, free T4 0.88, T3 103.  2/9/2022: TSH 2.200.  4/27/2022: TSH 1.970, free T4 0.85, T3 116.  8/10/2022: TSH 2.290. Prior treatment of hyperthyroidism: Methimazole 5 mg daily was initiated 7/28/2016. Her dose has been adjusted as follows:  -2.5 mg daily 9/8/2016  -5 mg daily 1/28/2021     Prior biopsies: none    Review of Systems   Constitutional:  Positive for fatigue. Negative for unexpected weight change. Cardiovascular:  Negative for palpitations. Gastrointestinal:  Negative for constipation and diarrhea.      BP (!) 168/70   Pulse 90   Ht 5' 3\" (1.6 m)   Wt 166 lb (75.3 kg)   SpO2 96%   BMI 29.41 kg/m²   Wt Readings from Last 3 Encounters:   11/02/22 166 lb (75.3 kg) 08/17/22 168 lb (76.2 kg)   05/04/22 163 lb (73.9 kg)       Physical Exam  HENT:      Head: Normocephalic. Neck:      Thyroid: No thyroid mass or thyromegaly. Cardiovascular:      Rate and Rhythm: Normal rate. Pulmonary:      Effort: No respiratory distress. Breath sounds: Normal breath sounds. Neurological:      Mental Status: She is alert.    Psychiatric:         Mood and Affect: Mood normal.         Behavior: Behavior normal.       Orders Placed This Encounter   Procedures    TSH     Standing Status:   Future     Number of Occurrences:   1     Standing Expiration Date:   11/2/2023    T4, Free     Standing Status:   Future     Number of Occurrences:   1     Standing Expiration Date:   11/2/2023    T3     Standing Status:   Future     Number of Occurrences:   1     Standing Expiration Date:   11/2/2023    Hepatic Function Panel     Standing Status:   Future     Number of Occurrences:   1     Standing Expiration Date:   11/2/2023    TSH     Standing Status:   Future     Standing Expiration Date:   11/2/2023    T4, Free     Standing Status:   Future     Standing Expiration Date:   11/2/2023    T3     Standing Status:   Future     Standing Expiration Date:   11/2/2023    Hepatic Function Panel     Standing Status:   Future     Standing Expiration Date:   11/2/2023       Current Outpatient Medications   Medication Sig Dispense Refill    methIMAzole (TAPAZOLE) 5 MG tablet Take 1 tablet by mouth daily 90 tablet 3    amLODIPine (NORVASC) 10 MG tablet Take 10 mg by mouth daily      ascorbic acid (VITAMIN C) 500 MG tablet Take 500 mg by mouth      aspirin 81 MG EC tablet Take 81 mg by mouth      glipiZIDE (GLUCOTROL XL) 10 MG extended release tablet Take 10 mg by mouth daily      lisinopril-hydroCHLOROthiazide (PRINZIDE;ZESTORETIC) 20-12.5 MG per tablet Take 2 tablets by mouth daily      metFORMIN (GLUCOPHAGE) 500 MG tablet Take 1 tab in the morning and 2 tab at night Indications: type 2 diabetes mellitus rosuvastatin (CRESTOR) 20 MG tablet Take 20 mg by mouth       No current facility-administered medications for this visit. Chun Diaz MD, FACE      Portions of this note were generated with the assistance of voice recognition software. As such, some errors in transcription may be present.

## 2022-11-21 DIAGNOSIS — I10 ESSENTIAL HYPERTENSION WITH GOAL BLOOD PRESSURE LESS THAN 140/90: ICD-10-CM

## 2022-11-21 DIAGNOSIS — N18.32 ANEMIA DUE TO STAGE 3B CHRONIC KIDNEY DISEASE (HCC): ICD-10-CM

## 2022-11-21 DIAGNOSIS — E11.22 TYPE 2 DIABETES MELLITUS WITH STAGE 3B CHRONIC KIDNEY DISEASE, WITHOUT LONG-TERM CURRENT USE OF INSULIN (HCC): Primary | ICD-10-CM

## 2022-11-21 DIAGNOSIS — E78.00 HYPERCHOLESTEROLEMIA: ICD-10-CM

## 2022-11-21 DIAGNOSIS — D63.1 ANEMIA DUE TO STAGE 3B CHRONIC KIDNEY DISEASE (HCC): ICD-10-CM

## 2022-11-21 DIAGNOSIS — E05.90 HYPERTHYROIDISM: ICD-10-CM

## 2022-11-21 DIAGNOSIS — N18.32 TYPE 2 DIABETES MELLITUS WITH STAGE 3B CHRONIC KIDNEY DISEASE, WITHOUT LONG-TERM CURRENT USE OF INSULIN (HCC): Primary | ICD-10-CM

## 2022-11-23 DIAGNOSIS — D63.1 ANEMIA DUE TO STAGE 3B CHRONIC KIDNEY DISEASE (HCC): ICD-10-CM

## 2022-11-23 DIAGNOSIS — I10 ESSENTIAL HYPERTENSION WITH GOAL BLOOD PRESSURE LESS THAN 140/90: ICD-10-CM

## 2022-11-23 DIAGNOSIS — E78.00 HYPERCHOLESTEROLEMIA: ICD-10-CM

## 2022-11-23 DIAGNOSIS — N18.32 TYPE 2 DIABETES MELLITUS WITH STAGE 3B CHRONIC KIDNEY DISEASE, WITHOUT LONG-TERM CURRENT USE OF INSULIN (HCC): ICD-10-CM

## 2022-11-23 DIAGNOSIS — N18.32 ANEMIA DUE TO STAGE 3B CHRONIC KIDNEY DISEASE (HCC): ICD-10-CM

## 2022-11-23 DIAGNOSIS — E05.90 HYPERTHYROIDISM: ICD-10-CM

## 2022-11-23 DIAGNOSIS — E11.22 TYPE 2 DIABETES MELLITUS WITH STAGE 3B CHRONIC KIDNEY DISEASE, WITHOUT LONG-TERM CURRENT USE OF INSULIN (HCC): ICD-10-CM

## 2022-11-23 LAB
ALBUMIN SERPL-MCNC: 3.7 G/DL (ref 3.2–4.6)
ALBUMIN/GLOB SERPL: 1.1 {RATIO} (ref 0.4–1.6)
ALP SERPL-CCNC: 60 U/L (ref 50–136)
ALT SERPL-CCNC: 21 U/L (ref 12–65)
ANION GAP SERPL CALC-SCNC: 7 MMOL/L (ref 2–11)
AST SERPL-CCNC: 17 U/L (ref 15–37)
BASOPHILS # BLD: 0.1 K/UL (ref 0–0.2)
BASOPHILS NFR BLD: 1 % (ref 0–2)
BILIRUB SERPL-MCNC: 0.3 MG/DL (ref 0.2–1.1)
BUN SERPL-MCNC: 24 MG/DL (ref 8–23)
CALCIUM SERPL-MCNC: 10 MG/DL (ref 8.3–10.4)
CHLORIDE SERPL-SCNC: 108 MMOL/L (ref 101–110)
CHOLEST SERPL-MCNC: 149 MG/DL
CO2 SERPL-SCNC: 26 MMOL/L (ref 21–32)
CREAT SERPL-MCNC: 1.4 MG/DL (ref 0.6–1)
DIFFERENTIAL METHOD BLD: ABNORMAL
EOSINOPHIL # BLD: 0.1 K/UL (ref 0–0.8)
EOSINOPHIL NFR BLD: 2 % (ref 0.5–7.8)
ERYTHROCYTE [DISTWIDTH] IN BLOOD BY AUTOMATED COUNT: 15.4 % (ref 11.9–14.6)
EST. AVERAGE GLUCOSE BLD GHB EST-MCNC: 140 MG/DL
GLOBULIN SER CALC-MCNC: 3.3 G/DL (ref 2.8–4.5)
GLUCOSE SERPL-MCNC: 109 MG/DL (ref 65–100)
HBA1C MFR BLD: 6.5 % (ref 4.8–5.6)
HCT VFR BLD AUTO: 30.7 % (ref 35.8–46.3)
HDLC SERPL-MCNC: 54 MG/DL (ref 40–60)
HDLC SERPL: 2.8 {RATIO}
HGB BLD-MCNC: 9.4 G/DL (ref 11.7–15.4)
IMM GRANULOCYTES # BLD AUTO: 0 K/UL (ref 0–0.5)
IMM GRANULOCYTES NFR BLD AUTO: 0 % (ref 0–5)
LDLC SERPL CALC-MCNC: 74 MG/DL
LYMPHOCYTES # BLD: 1.7 K/UL (ref 0.5–4.6)
LYMPHOCYTES NFR BLD: 30 % (ref 13–44)
MCH RBC QN AUTO: 26.3 PG (ref 26.1–32.9)
MCHC RBC AUTO-ENTMCNC: 30.6 G/DL (ref 31.4–35)
MCV RBC AUTO: 85.8 FL (ref 82–102)
MONOCYTES # BLD: 0.4 K/UL (ref 0.1–1.3)
MONOCYTES NFR BLD: 7 % (ref 4–12)
NEUTS SEG # BLD: 3.4 K/UL (ref 1.7–8.2)
NEUTS SEG NFR BLD: 60 % (ref 43–78)
NRBC # BLD: 0 K/UL (ref 0–0.2)
PLATELET # BLD AUTO: 256 K/UL (ref 150–450)
PMV BLD AUTO: 9.6 FL (ref 9.4–12.3)
POTASSIUM SERPL-SCNC: 4.1 MMOL/L (ref 3.5–5.1)
PROT SERPL-MCNC: 7 G/DL (ref 6.3–8.2)
RBC # BLD AUTO: 3.58 M/UL (ref 4.05–5.2)
SODIUM SERPL-SCNC: 141 MMOL/L (ref 133–143)
TRIGL SERPL-MCNC: 105 MG/DL (ref 35–150)
TSH, 3RD GENERATION: 2.9 UIU/ML (ref 0.36–3.74)
VLDLC SERPL CALC-MCNC: 21 MG/DL (ref 6–23)
WBC # BLD AUTO: 5.7 K/UL (ref 4.3–11.1)

## 2022-11-30 ENCOUNTER — OFFICE VISIT (OUTPATIENT)
Dept: PRIMARY CARE CLINIC | Facility: CLINIC | Age: 82
End: 2022-11-30
Payer: MEDICARE

## 2022-11-30 ENCOUNTER — TELEPHONE (OUTPATIENT)
Dept: PRIMARY CARE CLINIC | Facility: CLINIC | Age: 82
End: 2022-11-30

## 2022-11-30 VITALS
WEIGHT: 162 LBS | SYSTOLIC BLOOD PRESSURE: 169 MMHG | OXYGEN SATURATION: 100 % | HEART RATE: 84 BPM | HEIGHT: 63 IN | TEMPERATURE: 97.6 F | BODY MASS INDEX: 28.7 KG/M2 | DIASTOLIC BLOOD PRESSURE: 57 MMHG

## 2022-11-30 DIAGNOSIS — E78.00 HYPERCHOLESTEROLEMIA: ICD-10-CM

## 2022-11-30 DIAGNOSIS — I10 ESSENTIAL HYPERTENSION WITH GOAL BLOOD PRESSURE LESS THAN 140/90: Primary | ICD-10-CM

## 2022-11-30 DIAGNOSIS — N18.31 STAGE 3A CHRONIC KIDNEY DISEASE (HCC): ICD-10-CM

## 2022-11-30 DIAGNOSIS — E11.22 TYPE 2 DIABETES MELLITUS WITH STAGE 3B CHRONIC KIDNEY DISEASE, WITHOUT LONG-TERM CURRENT USE OF INSULIN (HCC): ICD-10-CM

## 2022-11-30 DIAGNOSIS — N18.32 TYPE 2 DIABETES MELLITUS WITH STAGE 3B CHRONIC KIDNEY DISEASE, WITHOUT LONG-TERM CURRENT USE OF INSULIN (HCC): ICD-10-CM

## 2022-11-30 DIAGNOSIS — E05.90 HYPERTHYROIDISM: ICD-10-CM

## 2022-11-30 PROCEDURE — G8417 CALC BMI ABV UP PARAM F/U: HCPCS | Performed by: FAMILY MEDICINE

## 2022-11-30 PROCEDURE — 1036F TOBACCO NON-USER: CPT | Performed by: FAMILY MEDICINE

## 2022-11-30 PROCEDURE — 3075F SYST BP GE 130 - 139MM HG: CPT | Performed by: FAMILY MEDICINE

## 2022-11-30 PROCEDURE — 3044F HG A1C LEVEL LT 7.0%: CPT | Performed by: FAMILY MEDICINE

## 2022-11-30 PROCEDURE — G8427 DOCREV CUR MEDS BY ELIG CLIN: HCPCS | Performed by: FAMILY MEDICINE

## 2022-11-30 PROCEDURE — 1090F PRES/ABSN URINE INCON ASSESS: CPT | Performed by: FAMILY MEDICINE

## 2022-11-30 PROCEDURE — G8484 FLU IMMUNIZE NO ADMIN: HCPCS | Performed by: FAMILY MEDICINE

## 2022-11-30 PROCEDURE — 1123F ACP DISCUSS/DSCN MKR DOCD: CPT | Performed by: FAMILY MEDICINE

## 2022-11-30 PROCEDURE — 99214 OFFICE O/P EST MOD 30 MIN: CPT | Performed by: FAMILY MEDICINE

## 2022-11-30 PROCEDURE — 3078F DIAST BP <80 MM HG: CPT | Performed by: FAMILY MEDICINE

## 2022-11-30 PROCEDURE — G8399 PT W/DXA RESULTS DOCUMENT: HCPCS | Performed by: FAMILY MEDICINE

## 2022-11-30 RX ORDER — CARVEDILOL PHOSPHATE 10 MG/1
10 CAPSULE, EXTENDED RELEASE ORAL DAILY
Qty: 30 CAPSULE | Refills: 5 | Status: SHIPPED | OUTPATIENT
Start: 2022-11-30

## 2022-11-30 RX ORDER — CARVEDILOL PHOSPHATE 10 MG/1
10 CAPSULE, EXTENDED RELEASE ORAL DAILY
Qty: 30 CAPSULE | Refills: 3 | Status: SHIPPED | OUTPATIENT
Start: 2022-11-30 | End: 2022-11-30 | Stop reason: SDUPTHER

## 2022-11-30 ASSESSMENT — PATIENT HEALTH QUESTIONNAIRE - PHQ9
SUM OF ALL RESPONSES TO PHQ QUESTIONS 1-9: 0
SUM OF ALL RESPONSES TO PHQ QUESTIONS 1-9: 0
SUM OF ALL RESPONSES TO PHQ9 QUESTIONS 1 & 2: 0
SUM OF ALL RESPONSES TO PHQ QUESTIONS 1-9: 0
2. FEELING DOWN, DEPRESSED OR HOPELESS: 0
SUM OF ALL RESPONSES TO PHQ QUESTIONS 1-9: 0
1. LITTLE INTEREST OR PLEASURE IN DOING THINGS: 0

## 2022-11-30 NOTE — TELEPHONE ENCOUNTER
Patient states the carvedilol CP24 is too expensive, she wonders if this can be changed to not an extended release  so it will be cheaper? Please call patient.     Meena Cruz

## 2022-11-30 NOTE — PROGRESS NOTES
[facility] PRIMARY CARE  Opal Wise M.D.  [address] [facility].  [place]  Ph No:  [phone]  Fax:  [phone]:  Chief Complaint   Patient presents with    Diabetes     Patient is due for a Diabetic foot exam.    Discuss Labs     Patient is here to discuss labs today. IMPRESSION/PLAN    1. Essential hypertension with goal blood pressure less than 140/90  2. Hyperthyroidism  3. Type 2 diabetes mellitus with stage 3b chronic kidney disease, without long-term current use of insulin ([address]  4. Hypercholesterolemia  5. Stage 3a chronic kidney disease (HCC)    Blood pressure is uncontrolled. Adding carvedilol extended release 10 mg once daily. Recheck blood pressure here in 2 to 3 weeks. Side effects are discussed. Call if any adverse reactions. Diabetes is well controlled. Continue glipizide and metformin at current dose. Monitor for hypoglycemia. She denies any episodes currently. We reviewed her labs which are all stable. Continue with current medications as prescribed. A1c currently at 6.5. Continue to follow-up with endocrinology for management of her thyroid disease. She has good days and bad days. She has chronic fatigue. Discussed with endocrinology. HISTORY OF PRESENT ILLNESS:  Here for follow-up and review labs. She states today is a good day. She has hyperthyroidism and currently on Tapazole. Managed by Dr. Korin Price. She states that some days she has extreme fatigue. She also has history of diabetes. Blood sugars have been well controlled. She denies any hypoglycemic episodes. Does not have glucose diary. She has hypertension. Blood pressure has been persistently elevated. No headaches or blurred vision. She has chronic kidney disease. She has no other complaints. She denies any falls or injury. Manages her medications independently.          REVIEW OF SYSTEMS:  Review of Systems    Review of systems is as stated above, otherwise is negative. PHYSICAL EXAM:  Vital Signs - BP (!) 169/57 (Site: Left Upper Arm, Position: Sitting, Cuff Size: Small Adult)   Pulse 84   Temp 97.6 °F (36.4 °C) (Temporal)   Ht 5' 3\" (1.6 m)   Wt 162 lb (73.5 kg)   SpO2 100%   BMI 28.70 kg/m²      Physical Exam  Constitutional:       Appearance: She is obese. Cardiovascular:      Rate and Rhythm: Normal rate and regular rhythm. Pulmonary:      Effort: Pulmonary effort is normal.   Musculoskeletal:         General: Normal range of motion. Cervical back: Normal range of motion. Neurological:      General: No focal deficit present. Mental Status: She is alert and oriented to person, place, and time. Comments: Diabetic foot exam reveals intact sensations bilaterally. Vibratory sense is retained. There is no obvious deformity. There is no cyanosis no clubbing. Pulses palpable and equal bilaterally. There are no cuts lacerations or abrasions. No obvious evidence of neuropathy. Psychiatric:         Mood and Affect: Mood normal.         Behavior: Behavior normal.            Opal Walters MD            Dictated using voice recognition software.  Proofread, but unrecognized voice recognition errors may exist.

## 2022-12-01 ENCOUNTER — TELEPHONE (OUTPATIENT)
Dept: PRIMARY CARE CLINIC | Facility: CLINIC | Age: 82
End: 2022-12-01

## 2022-12-01 RX ORDER — CARVEDILOL 3.12 MG/1
3.12 TABLET ORAL 2 TIMES DAILY
Qty: 60 TABLET | Refills: 5 | Status: SHIPPED | OUTPATIENT
Start: 2022-12-01

## 2022-12-14 ENCOUNTER — NURSE ONLY (OUTPATIENT)
Dept: PRIMARY CARE CLINIC | Facility: CLINIC | Age: 82
End: 2022-12-14
Payer: MEDICARE

## 2022-12-14 VITALS — HEART RATE: 72 BPM | SYSTOLIC BLOOD PRESSURE: 146 MMHG | DIASTOLIC BLOOD PRESSURE: 42 MMHG

## 2022-12-14 DIAGNOSIS — I10 ESSENTIAL HYPERTENSION WITH GOAL BLOOD PRESSURE LESS THAN 140/90: Primary | ICD-10-CM

## 2022-12-14 PROCEDURE — 3075F SYST BP GE 130 - 139MM HG: CPT | Performed by: FAMILY MEDICINE

## 2022-12-14 PROCEDURE — 3078F DIAST BP <80 MM HG: CPT | Performed by: FAMILY MEDICINE

## 2022-12-14 PROCEDURE — 99421 OL DIG E/M SVC 5-10 MIN: CPT | Performed by: FAMILY MEDICINE

## 2022-12-14 NOTE — PROGRESS NOTES
Patient took her medication around 8:30 am. She takes Amlodipine 10mg, Carvedilol 3.125mg, and Lisinopril- HCTZ 20-12.5mg. Patient states she feels better and her blood pressure at home has been running in the 136/69, 112/66. Per Dr. Webster Areas she is to remain on her same medication.

## 2023-01-13 RX ORDER — GLIPIZIDE 10 MG/1
TABLET, FILM COATED, EXTENDED RELEASE ORAL
Qty: 90 TABLET | Refills: 3 | Status: SHIPPED | OUTPATIENT
Start: 2023-01-13

## 2023-02-28 DIAGNOSIS — I10 ESSENTIAL HYPERTENSION WITH GOAL BLOOD PRESSURE LESS THAN 140/90: Primary | ICD-10-CM

## 2023-02-28 DIAGNOSIS — E78.00 HYPERCHOLESTEROLEMIA: ICD-10-CM

## 2023-02-28 DIAGNOSIS — N18.31 STAGE 3A CHRONIC KIDNEY DISEASE (HCC): ICD-10-CM

## 2023-02-28 DIAGNOSIS — E05.90 HYPERTHYROIDISM: ICD-10-CM

## 2023-02-28 DIAGNOSIS — N18.32 TYPE 2 DIABETES MELLITUS WITH STAGE 3B CHRONIC KIDNEY DISEASE, WITHOUT LONG-TERM CURRENT USE OF INSULIN (HCC): ICD-10-CM

## 2023-02-28 DIAGNOSIS — E11.22 TYPE 2 DIABETES MELLITUS WITH STAGE 3B CHRONIC KIDNEY DISEASE, WITHOUT LONG-TERM CURRENT USE OF INSULIN (HCC): ICD-10-CM

## 2023-03-01 DIAGNOSIS — E11.22 TYPE 2 DIABETES MELLITUS WITH STAGE 3B CHRONIC KIDNEY DISEASE, WITHOUT LONG-TERM CURRENT USE OF INSULIN (HCC): ICD-10-CM

## 2023-03-01 DIAGNOSIS — E78.00 HYPERCHOLESTEROLEMIA: ICD-10-CM

## 2023-03-01 DIAGNOSIS — E05.90 HYPERTHYROIDISM: ICD-10-CM

## 2023-03-01 DIAGNOSIS — N18.31 STAGE 3A CHRONIC KIDNEY DISEASE (HCC): ICD-10-CM

## 2023-03-01 DIAGNOSIS — N18.32 TYPE 2 DIABETES MELLITUS WITH STAGE 3B CHRONIC KIDNEY DISEASE, WITHOUT LONG-TERM CURRENT USE OF INSULIN (HCC): ICD-10-CM

## 2023-03-01 DIAGNOSIS — I10 ESSENTIAL HYPERTENSION WITH GOAL BLOOD PRESSURE LESS THAN 140/90: ICD-10-CM

## 2023-03-01 LAB
BASOPHILS # BLD: 0 K/UL (ref 0–0.2)
BASOPHILS NFR BLD: 1 % (ref 0–2)
CHOLEST SERPL-MCNC: 160 MG/DL
DIFFERENTIAL METHOD BLD: ABNORMAL
EOSINOPHIL # BLD: 0.2 K/UL (ref 0–0.8)
EOSINOPHIL NFR BLD: 3 % (ref 0.5–7.8)
ERYTHROCYTE [DISTWIDTH] IN BLOOD BY AUTOMATED COUNT: 15.8 % (ref 11.9–14.6)
HCT VFR BLD AUTO: 31.8 % (ref 35.8–46.3)
HDLC SERPL-MCNC: 58 MG/DL (ref 40–60)
HDLC SERPL: 2.8
HGB BLD-MCNC: 10 G/DL (ref 11.7–15.4)
IMM GRANULOCYTES # BLD AUTO: 0 K/UL (ref 0–0.5)
IMM GRANULOCYTES NFR BLD AUTO: 0 % (ref 0–5)
LDLC SERPL CALC-MCNC: 80.4 MG/DL
LYMPHOCYTES # BLD: 1.6 K/UL (ref 0.5–4.6)
LYMPHOCYTES NFR BLD: 27 % (ref 13–44)
MCH RBC QN AUTO: 26.5 PG (ref 26.1–32.9)
MCHC RBC AUTO-ENTMCNC: 31.4 G/DL (ref 31.4–35)
MCV RBC AUTO: 84.4 FL (ref 82–102)
MONOCYTES # BLD: 0.4 K/UL (ref 0.1–1.3)
MONOCYTES NFR BLD: 7 % (ref 4–12)
NEUTS SEG # BLD: 3.8 K/UL (ref 1.7–8.2)
NEUTS SEG NFR BLD: 62 % (ref 43–78)
NRBC # BLD: 0 K/UL (ref 0–0.2)
PLATELET # BLD AUTO: 223 K/UL (ref 150–450)
PMV BLD AUTO: 10.8 FL (ref 9.4–12.3)
RBC # BLD AUTO: 3.77 M/UL (ref 4.05–5.2)
TRIGL SERPL-MCNC: 108 MG/DL (ref 35–150)
TSH, 3RD GENERATION: 2.49 UIU/ML (ref 0.36–3.74)
VLDLC SERPL CALC-MCNC: 21.6 MG/DL (ref 6–23)
WBC # BLD AUTO: 6.1 K/UL (ref 4.3–11.1)

## 2023-03-02 LAB
ALBUMIN SERPL-MCNC: 3.6 G/DL (ref 3.2–4.6)
ALBUMIN/GLOB SERPL: 0.9 (ref 0.4–1.6)
ALP SERPL-CCNC: 63 U/L (ref 50–136)
ALT SERPL-CCNC: 24 U/L (ref 12–65)
ANION GAP SERPL CALC-SCNC: 9 MMOL/L (ref 2–11)
AST SERPL-CCNC: 21 U/L (ref 15–37)
BILIRUB SERPL-MCNC: 0.2 MG/DL (ref 0.2–1.1)
BUN SERPL-MCNC: 26 MG/DL (ref 8–23)
CALCIUM SERPL-MCNC: 9.8 MG/DL (ref 8.3–10.4)
CHLORIDE SERPL-SCNC: 108 MMOL/L (ref 101–110)
CO2 SERPL-SCNC: 24 MMOL/L (ref 21–32)
CREAT SERPL-MCNC: 1.3 MG/DL (ref 0.6–1)
EST. AVERAGE GLUCOSE BLD GHB EST-MCNC: 128 MG/DL
GLOBULIN SER CALC-MCNC: 3.8 G/DL (ref 2.8–4.5)
GLUCOSE SERPL-MCNC: 107 MG/DL (ref 65–100)
HBA1C MFR BLD: 6.1 % (ref 4.8–5.6)
POTASSIUM SERPL-SCNC: 4.3 MMOL/L (ref 3.5–5.1)
PROT SERPL-MCNC: 7.4 G/DL (ref 6.3–8.2)
SODIUM SERPL-SCNC: 141 MMOL/L (ref 133–143)

## 2023-03-08 ENCOUNTER — OFFICE VISIT (OUTPATIENT)
Dept: PRIMARY CARE CLINIC | Facility: CLINIC | Age: 83
End: 2023-03-08
Payer: MEDICARE

## 2023-03-08 VITALS
DIASTOLIC BLOOD PRESSURE: 60 MMHG | BODY MASS INDEX: 29.06 KG/M2 | SYSTOLIC BLOOD PRESSURE: 173 MMHG | HEIGHT: 63 IN | TEMPERATURE: 97.3 F | HEART RATE: 70 BPM | WEIGHT: 164 LBS | OXYGEN SATURATION: 99 %

## 2023-03-08 DIAGNOSIS — N18.31 ANEMIA DUE TO STAGE 3A CHRONIC KIDNEY DISEASE (HCC): ICD-10-CM

## 2023-03-08 DIAGNOSIS — N18.32 TYPE 2 DIABETES MELLITUS WITH STAGE 3B CHRONIC KIDNEY DISEASE, WITHOUT LONG-TERM CURRENT USE OF INSULIN (HCC): ICD-10-CM

## 2023-03-08 DIAGNOSIS — M54.32 LEFT SIDED SCIATICA: ICD-10-CM

## 2023-03-08 DIAGNOSIS — D63.1 ANEMIA DUE TO STAGE 3A CHRONIC KIDNEY DISEASE (HCC): ICD-10-CM

## 2023-03-08 DIAGNOSIS — E05.90 HYPERTHYROIDISM: ICD-10-CM

## 2023-03-08 DIAGNOSIS — I10 ESSENTIAL HYPERTENSION WITH GOAL BLOOD PRESSURE LESS THAN 140/90: ICD-10-CM

## 2023-03-08 DIAGNOSIS — E11.22 TYPE 2 DIABETES MELLITUS WITH STAGE 3B CHRONIC KIDNEY DISEASE, WITHOUT LONG-TERM CURRENT USE OF INSULIN (HCC): ICD-10-CM

## 2023-03-08 DIAGNOSIS — Z00.00 MEDICARE ANNUAL WELLNESS VISIT, SUBSEQUENT: Primary | ICD-10-CM

## 2023-03-08 DIAGNOSIS — N18.31 STAGE 3A CHRONIC KIDNEY DISEASE (HCC): ICD-10-CM

## 2023-03-08 PROCEDURE — G0439 PPPS, SUBSEQ VISIT: HCPCS | Performed by: FAMILY MEDICINE

## 2023-03-08 PROCEDURE — G8484 FLU IMMUNIZE NO ADMIN: HCPCS | Performed by: FAMILY MEDICINE

## 2023-03-08 PROCEDURE — 1090F PRES/ABSN URINE INCON ASSESS: CPT | Performed by: FAMILY MEDICINE

## 2023-03-08 PROCEDURE — G8399 PT W/DXA RESULTS DOCUMENT: HCPCS | Performed by: FAMILY MEDICINE

## 2023-03-08 PROCEDURE — 99213 OFFICE O/P EST LOW 20 MIN: CPT | Performed by: FAMILY MEDICINE

## 2023-03-08 PROCEDURE — 1036F TOBACCO NON-USER: CPT | Performed by: FAMILY MEDICINE

## 2023-03-08 PROCEDURE — G8417 CALC BMI ABV UP PARAM F/U: HCPCS | Performed by: FAMILY MEDICINE

## 2023-03-08 PROCEDURE — G8427 DOCREV CUR MEDS BY ELIG CLIN: HCPCS | Performed by: FAMILY MEDICINE

## 2023-03-08 PROCEDURE — 96372 THER/PROPH/DIAG INJ SC/IM: CPT | Performed by: FAMILY MEDICINE

## 2023-03-08 PROCEDURE — 3078F DIAST BP <80 MM HG: CPT | Performed by: FAMILY MEDICINE

## 2023-03-08 PROCEDURE — 1123F ACP DISCUSS/DSCN MKR DOCD: CPT | Performed by: FAMILY MEDICINE

## 2023-03-08 PROCEDURE — 3077F SYST BP >= 140 MM HG: CPT | Performed by: FAMILY MEDICINE

## 2023-03-08 PROCEDURE — 3044F HG A1C LEVEL LT 7.0%: CPT | Performed by: FAMILY MEDICINE

## 2023-03-08 RX ORDER — DEXAMETHASONE SODIUM PHOSPHATE 4 MG/ML
6 INJECTION, SOLUTION INTRA-ARTICULAR; INTRALESIONAL; INTRAMUSCULAR; INTRAVENOUS; SOFT TISSUE ONCE
Status: COMPLETED | OUTPATIENT
Start: 2023-03-08 | End: 2023-03-08

## 2023-03-08 RX ORDER — LISINOPRIL AND HYDROCHLOROTHIAZIDE 20; 12.5 MG/1; MG/1
2 TABLET ORAL DAILY
Qty: 360 TABLET | Refills: 1 | Status: SHIPPED | OUTPATIENT
Start: 2023-03-08

## 2023-03-08 RX ORDER — ROSUVASTATIN CALCIUM 20 MG/1
20 TABLET, COATED ORAL
Qty: 30 TABLET | Refills: 1 | Status: SHIPPED | OUTPATIENT
Start: 2023-03-08

## 2023-03-08 RX ORDER — AMLODIPINE BESYLATE 10 MG/1
10 TABLET ORAL DAILY
Qty: 90 TABLET | Refills: 3 | Status: SHIPPED | OUTPATIENT
Start: 2023-03-08

## 2023-03-08 RX ADMIN — DEXAMETHASONE SODIUM PHOSPHATE 6 MG: 4 INJECTION, SOLUTION INTRA-ARTICULAR; INTRALESIONAL; INTRAMUSCULAR; INTRAVENOUS; SOFT TISSUE at 12:00

## 2023-03-08 SDOH — ECONOMIC STABILITY: FOOD INSECURITY: WITHIN THE PAST 12 MONTHS, YOU WORRIED THAT YOUR FOOD WOULD RUN OUT BEFORE YOU GOT MONEY TO BUY MORE.: NEVER TRUE

## 2023-03-08 SDOH — ECONOMIC STABILITY: HOUSING INSECURITY
IN THE LAST 12 MONTHS, WAS THERE A TIME WHEN YOU DID NOT HAVE A STEADY PLACE TO SLEEP OR SLEPT IN A SHELTER (INCLUDING NOW)?: NO

## 2023-03-08 SDOH — ECONOMIC STABILITY: INCOME INSECURITY: HOW HARD IS IT FOR YOU TO PAY FOR THE VERY BASICS LIKE FOOD, HOUSING, MEDICAL CARE, AND HEATING?: NOT VERY HARD

## 2023-03-08 SDOH — ECONOMIC STABILITY: FOOD INSECURITY: WITHIN THE PAST 12 MONTHS, THE FOOD YOU BOUGHT JUST DIDN'T LAST AND YOU DIDN'T HAVE MONEY TO GET MORE.: NEVER TRUE

## 2023-03-08 ASSESSMENT — PATIENT HEALTH QUESTIONNAIRE - PHQ9
1. LITTLE INTEREST OR PLEASURE IN DOING THINGS: 0
SUM OF ALL RESPONSES TO PHQ QUESTIONS 1-9: 0
2. FEELING DOWN, DEPRESSED OR HOPELESS: 0
SUM OF ALL RESPONSES TO PHQ9 QUESTIONS 1 & 2: 0
SUM OF ALL RESPONSES TO PHQ QUESTIONS 1-9: 0

## 2023-03-08 ASSESSMENT — LIFESTYLE VARIABLES
HOW OFTEN DO YOU HAVE A DRINK CONTAINING ALCOHOL: NEVER
HOW MANY STANDARD DRINKS CONTAINING ALCOHOL DO YOU HAVE ON A TYPICAL DAY: PATIENT DOES NOT DRINK

## 2023-03-08 NOTE — PATIENT INSTRUCTIONS
Advance Directives: Care Instructions  Overview  An advance directive is a legal way to state your wishes at the end of your life. It tells your family and your doctor what to do if you can't say what you want. There are two main types of advance directives. You can change them any time your wishes change. Living will. This form tells your family and your doctor your wishes about life support and other treatment. The form is also called a declaration. Medical power of . This form lets you name a person to make treatment decisions for you when you can't speak for yourself. This person is called a health care agent (health care proxy, health care surrogate). The form is also called a durable power of  for health care. If you do not have an advance directive, decisions about your medical care may be made by a family member, or by a doctor or a  who doesn't know you. It may help to think of an advance directive as a gift to the people who care for you. If you have one, they won't have to make tough decisions by themselves. For more information, including forms for your state, see the [address] website (www.caringinfo.org/planning/advance-directives/). Follow-up care is a key part of your treatment and safety. Be sure to make and go to all appointments, and call your doctor if you are having problems. It's also a good idea to know your test results and keep a list of the medicines you take. What should you include in an advance directive? Many states have a unique advance directive form. (It may ask you to address specific issues.) Or you might use a universal form that's approved by many states. If your form doesn't tell you what to address, it may be hard to know what to include in your advance directive. Use the questions below to help you get started. Who do you want to make decisions about your medical care if you are not able to?   What life-support measures do you want if you have a serious illness that gets worse over time or can't be cured? What are you most afraid of that might happen? (Maybe you're afraid of having pain, losing your independence, or being kept alive by machines.)  Where would you prefer to die? (Your home? A hospital? A nursing home?)  Do you want to donate your organs when you die? Do you want certain Quaker practices performed before you die? When should you call for help? Be sure to contact your doctor if you have any questions. Where can you learn more? Go to http://www.gee.com/ and enter R264 to learn more about \"Advance Directives: Care Instructions. \"  Current as of: June 16, 2022               Content Version: 13.5  © 2006-2022 [employer]. Care instructions adapted under license by [facility] ([facility]). If you have questions about a medical condition or this instruction, always ask your healthcare professional. [place] [address] any warranty or liability for your use of this information. A Healthy Heart: Care Instructions  Your Care Instructions     Coronary artery disease, also called heart disease, occurs when a substance called plaque builds up in the vessels that supply oxygen-rich blood to your heart muscle. This can narrow the blood vessels and reduce blood flow. A heart attack happens when blood flow is completely blocked. A high-fat diet, smoking, and other factors increase the risk of heart disease. Your doctor has found that you have a chance of having heart disease. You can do lots of things to keep your heart healthy. It may not be easy, but you can change your diet, exercise more, and quit smoking. These steps really work to lower your chance of heart disease. Follow-up care is a key part of your treatment and safety. Be sure to make and go to all appointments, and call your doctor if you are having problems.  It's also a good idea to know your test results and keep a list of the medicines you take. How can you care for yourself at home? Diet    Use less salt when you cook and eat. This helps lower your blood pressure. Taste food before salting. Add only a little salt when you think you need it. With time, your taste buds will adjust to less salt.     Eat fewer snack items, fast foods, canned soups, and other high-salt, high-fat, processed foods.     Read food labels and try to avoid saturated and trans fats. They increase your risk of heart disease by raising cholesterol levels.     Limit the amount of solid fat-butter, margarine, and shortening-you eat. Use olive, peanut, or canola oil when you cook. Bake, broil, and steam foods instead of frying them.     Eat a variety of fruit and vegetables every day. Dark green, deep orange, red, or yellow fruits and vegetables are especially good for you. Examples include spinach, carrots, peaches, and berries.     Foods high in fiber can reduce your cholesterol and provide important vitamins and minerals. High-fiber foods include whole-grain cereals and breads, oatmeal, beans, brown rice, citrus fruits, and apples.     Eat lean proteins. Heart-healthy proteins include seafood, lean meats and poultry, eggs, beans, peas, nuts, seeds, and soy products.     Limit drinks and foods with added sugar. These include candy, desserts, and soda pop. Lifestyle changes    If your doctor recommends it, get more exercise. Walking is a good choice. Bit by bit, increase the amount you walk every day. Try for at least 30 minutes on most days of the week. You also may want to swim, bike, or do other activities.     Do not smoke. If you need help quitting, talk to your doctor about stop-smoking programs and medicines. These can increase your chances of quitting for good. Quitting smoking may be the most important step you can take to protect your heart. It is never too late to quit.     Limit alcohol to 2 drinks a day for men and 1 drink a day for women.  Too much alcohol can cause health problems.     Manage other health problems such as diabetes, high blood pressure, and high cholesterol. If you think you may have a problem with alcohol or drug use, talk to your doctor.   Medicines    Take your medicines exactly as prescribed. Call your doctor if you think you are having a problem with your medicine.     If your doctor recommends aspirin, take the amount directed each day. Make sure you take aspirin and not another kind of pain reliever, such as acetaminophen (Tylenol).   When should you call for help?   Call 911 if you have symptoms of a heart attack. These may include:    Chest pain or pressure, or a strange feeling in the chest.     Sweating.     Shortness of breath.     Pain, pressure, or a strange feeling in the back, neck, jaw, or upper belly or in one or both shoulders or arms.     Lightheadedness or sudden weakness.     A fast or irregular heartbeat.   After you call 911, the  may tell you to chew 1 adult-strength or 2 to 4 low-dose aspirin. Wait for an ambulance. Do not try to drive yourself.  Watch closely for changes in your health, and be sure to contact your doctor if you have any problems.  Where can you learn more?  Go to https://www.[employer].net/patientEd and enter F075 to learn more about \"A Healthy Heart: Care Instructions.\"  Current as of: September 7, 2022               Content Version: 13.5  © 2240-9949 [employer].   Care instructions adapted under license by [employer]. If you have questions about a medical condition or this instruction, always ask your healthcare professional. [employer] disclaims any warranty or liability for your use of this information.      Personalized Preventive Plan for Nguyen Gibson - 3/8/2023  Medicare offers a range of preventive health benefits. Some of the tests and screenings are paid in full while other may be subject to a deductible, co-insurance, and/or copay.    Some of these  benefits include a comprehensive review of your medical history including lifestyle, illnesses that may run in your family, and various assessments and screenings as appropriate. After reviewing your medical record and screening and assessments performed today your provider may have ordered immunizations, labs, imaging, and/or referrals for you. A list of these orders (if applicable) as well as your Preventive Care list are included within your After Visit Summary for your review. Other Preventive Recommendations:    A preventive eye exam performed by an eye specialist is recommended every 1-2 years to screen for glaucoma; cataracts, macular degeneration, and other eye disorders. A preventive dental visit is recommended every 6 months. Try to get at least 150 minutes of exercise per week or 10,000 steps per day on a pedometer . Order or download the FREE \"Exercise & Physical Activity: Your Everyday Guide\" from [employer]. Call 1-804.592.8465 or search [employer] online. You need 3212-1776 mg of calcium and 6484-0121 IU of vitamin D per day. It is possible to meet your calcium requirement with diet alone, but a vitamin D supplement is usually necessary to meet this goal.  When exposed to the sun, use a sunscreen that protects against both UVA and UVB radiation with an SPF of 30 or greater. Reapply every 2 to 3 hours or after sweating, drying off with a towel, or swimming. Always wear a seat belt when traveling in a car. Always wear a helmet when riding a bicycle or motorcycle.

## 2023-03-08 NOTE — PROGRESS NOTES
Medicare Annual Wellness Visit    Yalobusha Shoulders is here for Medicare AWV and Discuss Labs    Assessment & Plan   Medicare annual wellness visit, subsequent  Essential hypertension with goal blood pressure less than 140/90  Type 2 diabetes mellitus with stage 3b chronic kidney disease, without long-term current use of insulin (HCC)  Assessment & Plan:   Well-controlled, continue current medications, medication adherence emphasized and lifestyle modifications recommended  Orders:  -     External Referral To Podiatry  Stage 3a chronic kidney disease ([facility]  Assessment & Plan:   Well-controlled, continue current medications, medication adherence emphasized and lifestyle modifications recommended  Hyperthyroidism  Assessment & Plan:   Well-controlled, continue current medications, medication adherence emphasized and lifestyle modifications recommended  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  stable. continue to monitor. Left sided sciatica  -     dexamethasone (DECADRON) injection 6 mg; 6 mg, IntraMUSCular, ONCE, 1 dose, On Wed 3/8/23 at 1215      Patient has sciatica in the left hip. Given Decadron 6 mg IM. We are getting an x-ray of her hip if symptoms do not improve. Possibly refer to physical therapy. Continue remaining medications as prescribed. She will monitor her blood pressure at home and return with her meter and diary. Continue with current medications for now. Referring her to podiatry for inserts for her shoes to adjust for the difference in height of each leg. Recommendations for Preventive Services Due: see orders and patient instructions/AVS.  Recommended screening schedule for the next 5-10 years is provided to the patient in written form: see Patient Instructions/AVS.     Return in about 6 months (around 9/8/2023), or if symptoms worsen or fail to improve, for labs 1 week prior to visit.      Subjective   The following acute and/or chronic problems were also addressed today:  Here for follow-up and annual wellness. She states her blood pressure at home. Is well controlled in the 130s over 62s. Her blood pressure here however has been elevated. No chest pain or shortness of breath. She does complain of some pain in her left hip. She states that over the past few weeks she has had pain whenever she attempts to stand put weight on her hip. She denies any fall or injury. She states her left leg is slightly longer than her right. She states the pain radiates into her buttocks but above her knee. It is in the back of her thigh. She states that it does get slightly better as he ambulates. She has taken some Tylenol for pain. She otherwise has no other complaints. Has a history of diabetes and hypothyroidism. She states that she feels better since changing her beta-blocker from metoprolol to carvedilol. Patient's complete Health Risk Assessment and screening values have been reviewed and are found in Flowsheets. The following problems were reviewed today and where indicated follow up appointments were made and/or referrals ordered. Positive Risk Factor Screenings with Interventions:               General HRA Questions:  Select all that apply: (!) New or Increased Pain    Pain Interventions:  Patient has a sciatica on her left side. We gave her Decadron 6 mg IM and she can take Tylenol as needed for pain. Advanced Directives:  Do you have a Living Will?: (!) No    Intervention:  has an advanced directive - a copy HAS NOT been provided. Objective   Vitals:    03/08/23 1028   BP: (!) 173/60   Site: Left Upper Arm   Position: Sitting   Cuff Size: Small Adult   Pulse: 70   Temp: 97.3 °F (36.3 °C)   TempSrc: Temporal   SpO2: 99%   Weight: 164 lb (74.4 kg)   Height: 5' 3\" (1.6 m)      Body mass index is 29.05 kg/m².       General Appearance: alert and oriented to person, place and time, well-developed and well-nourished, in no acute distress  Pulmonary/Chest: clear to auscultation bilaterally- no wheezes, rales or rhonchi, normal air movement, no respiratory distress  Cardiovascular: normal rate, normal S1 and S2, no gallops, intact distal pulses, and no carotid bruits  Extremities: no cyanosis, no clubbing, and she has some mild edema in her left lower extremity. She does appear to be slightly  Longer than her left leg. She has limited range of motion of her left leg secondary to pain. Musculoskeletal: She has tenderness in her left sciatic area. Straight leg raise test is negative on the left. She has limited range of motion however due to pain in her left leg. She  Neurologic: speech normal and intact. She has a slight limp when she is waiting on her left. She does not require cane or walker. She      Allergies   Allergen Reactions    Atorvastatin Other (See Comments)     Leg pain    Hydrocodone-Acetaminophen Nausea Only and Rash     Prior to Visit Medications    Medication Sig Taking?  Authorizing Provider   amLODIPine (NORVASC) 10 MG tablet Take 1 tablet by mouth daily Yes Opal Sheehan MD   lisinopril-hydroCHLOROthiazide (PRINZIDE;ZESTORETIC) 20-12.5 MG per tablet Take 2 tablets by mouth daily Yes Dangelo Al MD   rosuvastatin (CRESTOR) 20 MG tablet Take 1 tablet by mouth nightly Yes Opal Sheehan MD   metFORMIN (GLUCOPHAGE) 500 MG tablet TAKE ONE TABLET BY MOUTH EVERY MORNING AND TAKE TWO TABLETS BY MOUTH EVERY NIGHT FOR TYPE 2 DIABETES Yes Opal Sheehan MD   GLIPIZIDE XL 10 MG extended release tablet TAKE 1 TABLET DAILY FOR    TYPE 2 DIABETES MELLITUS Yes Opal Sheehan MD   carvedilol (COREG) 3.125 MG tablet Take 1 tablet by mouth 2 times daily Yes Opal Sheehan MD   methIMAzole (TAPAZOLE) 5 MG tablet Take 1 tablet by mouth daily Yes João Koenig MD   ascorbic acid (VITAMIN C) 500 MG tablet Take 500 mg by mouth Yes Ar Automatic Reconciliation   aspirin 81 MG EC tablet Take 81 mg by mouth Yes Ar Automatic Reconciliation       CareTeam (Including outside providers/suppliers regularly involved in providing care):   Patient Care Team:  Eduardo Ortiz MD as PCP - Cesar Berger MD as PCP - [facility]led Provider     Reviewed and updated this visit:  Tobacco  Allergies  Meds  Problems  Med Hx  Surg Hx  Soc Hx  Fam Hx               Eduardo Ortiz MD

## 2023-03-22 ENCOUNTER — NURSE ONLY (OUTPATIENT)
Dept: PRIMARY CARE CLINIC | Facility: CLINIC | Age: 83
End: 2023-03-22

## 2023-03-22 VITALS — DIASTOLIC BLOOD PRESSURE: 43 MMHG | SYSTOLIC BLOOD PRESSURE: 146 MMHG | HEART RATE: 61 BPM

## 2023-03-22 NOTE — PROGRESS NOTES
Patient took her medication around 8am.Patient brought in her machine today to compare to our machine. She currently takes Amlodipine 10mg, Carvedilol 3.125mg Bid and Lisinopril-HCTZ 40-25mg. Her blood pressure on her machine runs in the 139/79, 142, 69, 143/65, /145/71 and a few in the 150's/70's.

## 2023-04-17 ENCOUNTER — NURSE ONLY (OUTPATIENT)
Dept: PRIMARY CARE CLINIC | Facility: CLINIC | Age: 83
End: 2023-04-17

## 2023-04-17 VITALS — SYSTOLIC BLOOD PRESSURE: 162 MMHG | DIASTOLIC BLOOD PRESSURE: 57 MMHG | HEART RATE: 91 BPM

## 2023-04-17 DIAGNOSIS — I10 ESSENTIAL HYPERTENSION WITH GOAL BLOOD PRESSURE LESS THAN 140/90: Primary | ICD-10-CM

## 2023-04-17 NOTE — PATIENT INSTRUCTIONS
137/60 at home this morning. Per Dr Jess Guy, continue current medications. Only take 1 tablet of carvedilol daily as 2 was making her sick.  Return at next scheduled visit

## 2023-05-02 RX ORDER — CARVEDILOL 3.12 MG/1
TABLET ORAL
Qty: 60 TABLET | Refills: 5 | Status: SHIPPED | OUTPATIENT
Start: 2023-05-02

## 2023-05-18 ENCOUNTER — TELEPHONE (OUTPATIENT)
Dept: PRIMARY CARE CLINIC | Facility: CLINIC | Age: 83
End: 2023-05-18

## 2023-05-23 ENCOUNTER — OFFICE VISIT (OUTPATIENT)
Dept: ENDOCRINOLOGY | Age: 83
End: 2023-05-23
Payer: MEDICARE

## 2023-05-23 VITALS — BODY MASS INDEX: 28.87 KG/M2 | SYSTOLIC BLOOD PRESSURE: 164 MMHG | WEIGHT: 163 LBS | DIASTOLIC BLOOD PRESSURE: 70 MMHG

## 2023-05-23 DIAGNOSIS — E11.22 TYPE 2 DIABETES MELLITUS WITH STAGE 3B CHRONIC KIDNEY DISEASE, WITHOUT LONG-TERM CURRENT USE OF INSULIN (HCC): ICD-10-CM

## 2023-05-23 DIAGNOSIS — E04.2 MULTIPLE THYROID NODULES: ICD-10-CM

## 2023-05-23 DIAGNOSIS — N18.32 TYPE 2 DIABETES MELLITUS WITH STAGE 3B CHRONIC KIDNEY DISEASE, WITHOUT LONG-TERM CURRENT USE OF INSULIN (HCC): ICD-10-CM

## 2023-05-23 DIAGNOSIS — E05.90 HYPERTHYROIDISM: Primary | ICD-10-CM

## 2023-05-23 PROCEDURE — G8399 PT W/DXA RESULTS DOCUMENT: HCPCS | Performed by: INTERNAL MEDICINE

## 2023-05-23 PROCEDURE — 1090F PRES/ABSN URINE INCON ASSESS: CPT | Performed by: INTERNAL MEDICINE

## 2023-05-23 PROCEDURE — 3044F HG A1C LEVEL LT 7.0%: CPT | Performed by: INTERNAL MEDICINE

## 2023-05-23 PROCEDURE — 3077F SYST BP >= 140 MM HG: CPT | Performed by: INTERNAL MEDICINE

## 2023-05-23 PROCEDURE — 3078F DIAST BP <80 MM HG: CPT | Performed by: INTERNAL MEDICINE

## 2023-05-23 PROCEDURE — G8428 CUR MEDS NOT DOCUMENT: HCPCS | Performed by: INTERNAL MEDICINE

## 2023-05-23 PROCEDURE — 1123F ACP DISCUSS/DSCN MKR DOCD: CPT | Performed by: INTERNAL MEDICINE

## 2023-05-23 PROCEDURE — G8417 CALC BMI ABV UP PARAM F/U: HCPCS | Performed by: INTERNAL MEDICINE

## 2023-05-23 PROCEDURE — 99214 OFFICE O/P EST MOD 30 MIN: CPT | Performed by: INTERNAL MEDICINE

## 2023-05-23 PROCEDURE — 1036F TOBACCO NON-USER: CPT | Performed by: INTERNAL MEDICINE

## 2023-05-23 RX ORDER — METHIMAZOLE 5 MG/1
5 TABLET ORAL DAILY
Qty: 90 TABLET | Refills: 3 | Status: SHIPPED | OUTPATIENT
Start: 2023-05-23

## 2023-05-23 ASSESSMENT — ENCOUNTER SYMPTOMS
VOICE CHANGE: 0
CONSTIPATION: 0
TROUBLE SWALLOWING: 0
DIARRHEA: 0

## 2023-05-23 NOTE — PROGRESS NOTES
Heterogeneous echotexture. Normal blood flow. 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 The largest are as follows: 0.99 x 1.34 x 1.33 cm isoechoic spongiform colloid nodule without calcifications or increased blood flow at the right upper pole. 1.74 x 1.85 x 2.24 cm complex isoechoic nodule without calcifications or increased blood flow at the right lower pole. 0.91 x 1.00 x 1.36 cm complex isoechoic nodule without calcifications or increased blood flow in the mid to lower portion of the left lobe. Prior laboratory evaluation:  3/25/2014: TSH 1.850.  5/4/2016: TSH 0.312.  7/28/2016: TSH 0.118, free T4 1.1, T3 1.49.  9/6/2016: TSH 1.360, free T4 0.88, T3 120.  12/7/2016: TSH 2.040, free T4 0.8, T3 1.21.  3/7/2017: TSH 1.420, free T4 0.9, T3 1.22.  7/10/2017: TSH 0.656, free T4 1.0, T3 1.17.  11/10/2017: TSH 1.120, free T4 1.0, T3 1.22.  2/16/2018: TSH 1.300.  9/10/2018: TSH 0.797, free T4 1.07, T3 126.  5/20/2019: TSH 0.786, free T4 1.0, T3 1.11.  6/4/2019: TSH 0.991.  12/3/2019: TSH 1.070.  6/3/2020: TSH 0.637.  7/21/2020: TSH 0.341, free T4 1.0, T3 1.30.  1/27/2021: TSH 0.173, free T4 1.10, T3 140.  4/22/2021: TSH 2.580, free T4 0.87, T3 117.  8/4/2021: TSH 1.920.  10/20/2021: TSH 3.100, free T4 0.88, T3 103.  2/9/2022: TSH 2.200.  4/27/2022: TSH 1.970, free T4 0.85, T3 116.  8/10/2022: TSH 2.290.  11/2/2022: TSH 1.450, free T4 0.9, T3 1.03.  11/23/2022: TSH 2.900.  3/1/2023: TSH 2.490. Prior treatment of hyperthyroidism: Methimazole 5 mg daily was initiated 7/28/2016. Her dose has been adjusted as follows:  -2.5 mg daily 9/8/2016  -5 mg daily 1/28/2021     Prior biopsies: none    Review of Systems   Constitutional:  Positive for fatigue. Negative for unexpected weight change. HENT:  Negative for trouble swallowing and voice change. Cardiovascular:  Negative for palpitations. Gastrointestinal:  Negative for constipation and diarrhea. Neurological:  Negative for tremors.

## 2023-07-24 RX ORDER — ROSUVASTATIN CALCIUM 20 MG/1
TABLET, COATED ORAL
Qty: 30 TABLET | Refills: 1 | Status: SHIPPED | OUTPATIENT
Start: 2023-07-24

## 2023-09-06 ENCOUNTER — NURSE ONLY (OUTPATIENT)
Dept: PRIMARY CARE CLINIC | Facility: CLINIC | Age: 83
End: 2023-09-06

## 2023-09-06 DIAGNOSIS — E11.22 TYPE 2 DIABETES MELLITUS WITH STAGE 3B CHRONIC KIDNEY DISEASE, WITHOUT LONG-TERM CURRENT USE OF INSULIN (HCC): ICD-10-CM

## 2023-09-06 DIAGNOSIS — N18.31 STAGE 3A CHRONIC KIDNEY DISEASE (HCC): ICD-10-CM

## 2023-09-06 DIAGNOSIS — E05.90 HYPERTHYROIDISM: ICD-10-CM

## 2023-09-06 DIAGNOSIS — E05.90 HYPERTHYROIDISM: Primary | ICD-10-CM

## 2023-09-06 DIAGNOSIS — E78.00 HYPERCHOLESTEROLEMIA: ICD-10-CM

## 2023-09-06 DIAGNOSIS — N18.31 ANEMIA DUE TO STAGE 3A CHRONIC KIDNEY DISEASE (HCC): ICD-10-CM

## 2023-09-06 DIAGNOSIS — N18.32 TYPE 2 DIABETES MELLITUS WITH STAGE 3B CHRONIC KIDNEY DISEASE, WITHOUT LONG-TERM CURRENT USE OF INSULIN (HCC): ICD-10-CM

## 2023-09-06 DIAGNOSIS — D63.1 ANEMIA DUE TO STAGE 3A CHRONIC KIDNEY DISEASE (HCC): ICD-10-CM

## 2023-09-06 LAB
ALBUMIN SERPL-MCNC: 3.5 G/DL (ref 3.2–4.6)
ALBUMIN/GLOB SERPL: 0.9 (ref 0.4–1.6)
ALP SERPL-CCNC: 65 U/L (ref 50–136)
ALT SERPL-CCNC: 20 U/L (ref 12–65)
ANION GAP SERPL CALC-SCNC: 7 MMOL/L (ref 2–11)
AST SERPL-CCNC: 20 U/L (ref 15–37)
BASOPHILS # BLD: 0.1 K/UL (ref 0–0.2)
BASOPHILS NFR BLD: 1 % (ref 0–2)
BILIRUB SERPL-MCNC: 0.2 MG/DL (ref 0.2–1.1)
BUN SERPL-MCNC: 31 MG/DL (ref 8–23)
CALCIUM SERPL-MCNC: 9.8 MG/DL (ref 8.3–10.4)
CHLORIDE SERPL-SCNC: 109 MMOL/L (ref 101–110)
CHOLEST SERPL-MCNC: 133 MG/DL
CO2 SERPL-SCNC: 26 MMOL/L (ref 21–32)
CREAT SERPL-MCNC: 1.7 MG/DL (ref 0.6–1)
DIFFERENTIAL METHOD BLD: ABNORMAL
EOSINOPHIL # BLD: 0.2 K/UL (ref 0–0.8)
EOSINOPHIL NFR BLD: 4 % (ref 0.5–7.8)
ERYTHROCYTE [DISTWIDTH] IN BLOOD BY AUTOMATED COUNT: 15.5 % (ref 11.9–14.6)
GLOBULIN SER CALC-MCNC: 3.7 G/DL (ref 2.8–4.5)
GLUCOSE SERPL-MCNC: 112 MG/DL (ref 65–100)
HCT VFR BLD AUTO: 29.5 % (ref 35.8–46.3)
HDLC SERPL-MCNC: 49 MG/DL (ref 40–60)
HDLC SERPL: 2.7
HGB BLD-MCNC: 9.1 G/DL (ref 11.7–15.4)
IMM GRANULOCYTES # BLD AUTO: 0 K/UL (ref 0–0.5)
IMM GRANULOCYTES NFR BLD AUTO: 0 % (ref 0–5)
LDLC SERPL CALC-MCNC: 65 MG/DL
LYMPHOCYTES # BLD: 1.5 K/UL (ref 0.5–4.6)
LYMPHOCYTES NFR BLD: 26 % (ref 13–44)
MCH RBC QN AUTO: 26.2 PG (ref 26.1–32.9)
MCHC RBC AUTO-ENTMCNC: 30.8 G/DL (ref 31.4–35)
MCV RBC AUTO: 85 FL (ref 82–102)
MONOCYTES # BLD: 0.5 K/UL (ref 0.1–1.3)
MONOCYTES NFR BLD: 8 % (ref 4–12)
NEUTS SEG # BLD: 3.6 K/UL (ref 1.7–8.2)
NEUTS SEG NFR BLD: 61 % (ref 43–78)
NRBC # BLD: 0 K/UL (ref 0–0.2)
PLATELET # BLD AUTO: 271 K/UL (ref 150–450)
PMV BLD AUTO: 9.4 FL (ref 9.4–12.3)
POTASSIUM SERPL-SCNC: 4.2 MMOL/L (ref 3.5–5.1)
PROT SERPL-MCNC: 7.2 G/DL (ref 6.3–8.2)
RBC # BLD AUTO: 3.47 M/UL (ref 4.05–5.2)
SODIUM SERPL-SCNC: 142 MMOL/L (ref 133–143)
TRIGL SERPL-MCNC: 95 MG/DL (ref 35–150)
TSH, 3RD GENERATION: 1.71 UIU/ML (ref 0.36–3.74)
VLDLC SERPL CALC-MCNC: 19 MG/DL (ref 6–23)
WBC # BLD AUTO: 5.8 K/UL (ref 4.3–11.1)

## 2023-09-07 LAB
EST. AVERAGE GLUCOSE BLD GHB EST-MCNC: 151 MG/DL
HBA1C MFR BLD: 6.9 % (ref 4.8–5.6)

## 2023-09-13 ENCOUNTER — OFFICE VISIT (OUTPATIENT)
Dept: PRIMARY CARE CLINIC | Facility: CLINIC | Age: 83
End: 2023-09-13
Payer: MEDICARE

## 2023-09-13 VITALS
TEMPERATURE: 97.8 F | HEART RATE: 72 BPM | WEIGHT: 160.8 LBS | DIASTOLIC BLOOD PRESSURE: 48 MMHG | HEIGHT: 63 IN | SYSTOLIC BLOOD PRESSURE: 157 MMHG | OXYGEN SATURATION: 100 % | BODY MASS INDEX: 28.49 KG/M2

## 2023-09-13 DIAGNOSIS — I10 ESSENTIAL HYPERTENSION WITH GOAL BLOOD PRESSURE LESS THAN 140/90: ICD-10-CM

## 2023-09-13 DIAGNOSIS — E11.22 TYPE 2 DIABETES MELLITUS WITH STAGE 4 CHRONIC KIDNEY DISEASE, WITHOUT LONG-TERM CURRENT USE OF INSULIN (HCC): Primary | ICD-10-CM

## 2023-09-13 DIAGNOSIS — N18.4 CKD STAGE 4 DUE TO TYPE 2 DIABETES MELLITUS (HCC): ICD-10-CM

## 2023-09-13 DIAGNOSIS — N18.4 ANEMIA DUE TO STAGE 4 CHRONIC KIDNEY DISEASE (HCC): ICD-10-CM

## 2023-09-13 DIAGNOSIS — N18.4 TYPE 2 DIABETES MELLITUS WITH STAGE 4 CHRONIC KIDNEY DISEASE, WITHOUT LONG-TERM CURRENT USE OF INSULIN (HCC): Primary | ICD-10-CM

## 2023-09-13 DIAGNOSIS — E78.00 HYPERCHOLESTEROLEMIA: ICD-10-CM

## 2023-09-13 DIAGNOSIS — D63.1 ANEMIA DUE TO STAGE 4 CHRONIC KIDNEY DISEASE (HCC): ICD-10-CM

## 2023-09-13 DIAGNOSIS — E05.90 HYPERTHYROIDISM: ICD-10-CM

## 2023-09-13 DIAGNOSIS — E11.22 CKD STAGE 4 DUE TO TYPE 2 DIABETES MELLITUS (HCC): ICD-10-CM

## 2023-09-13 PROCEDURE — G8417 CALC BMI ABV UP PARAM F/U: HCPCS | Performed by: FAMILY MEDICINE

## 2023-09-13 PROCEDURE — 1036F TOBACCO NON-USER: CPT | Performed by: FAMILY MEDICINE

## 2023-09-13 PROCEDURE — 3044F HG A1C LEVEL LT 7.0%: CPT | Performed by: FAMILY MEDICINE

## 2023-09-13 PROCEDURE — G8427 DOCREV CUR MEDS BY ELIG CLIN: HCPCS | Performed by: FAMILY MEDICINE

## 2023-09-13 PROCEDURE — G8399 PT W/DXA RESULTS DOCUMENT: HCPCS | Performed by: FAMILY MEDICINE

## 2023-09-13 PROCEDURE — 99214 OFFICE O/P EST MOD 30 MIN: CPT | Performed by: FAMILY MEDICINE

## 2023-09-13 PROCEDURE — 3077F SYST BP >= 140 MM HG: CPT | Performed by: FAMILY MEDICINE

## 2023-09-13 PROCEDURE — 1123F ACP DISCUSS/DSCN MKR DOCD: CPT | Performed by: FAMILY MEDICINE

## 2023-09-13 PROCEDURE — 3078F DIAST BP <80 MM HG: CPT | Performed by: FAMILY MEDICINE

## 2023-09-13 PROCEDURE — 1090F PRES/ABSN URINE INCON ASSESS: CPT | Performed by: FAMILY MEDICINE

## 2023-09-13 RX ORDER — CARVEDILOL 3.12 MG/1
3.12 TABLET ORAL 2 TIMES DAILY
Qty: 60 TABLET | Refills: 5 | Status: SHIPPED | OUTPATIENT
Start: 2023-09-13

## 2023-09-13 RX ORDER — ROSUVASTATIN CALCIUM 20 MG/1
20 TABLET, COATED ORAL NIGHTLY
Qty: 90 TABLET | Refills: 1 | Status: SHIPPED | OUTPATIENT
Start: 2023-09-13

## 2023-09-13 RX ORDER — METHIMAZOLE 5 MG/1
5 TABLET ORAL DAILY
Qty: 90 TABLET | Refills: 3 | Status: SHIPPED | OUTPATIENT
Start: 2023-09-13

## 2023-09-13 RX ORDER — LISINOPRIL AND HYDROCHLOROTHIAZIDE 20; 12.5 MG/1; MG/1
2 TABLET ORAL DAILY
Qty: 360 TABLET | Refills: 1 | Status: SHIPPED | OUTPATIENT
Start: 2023-09-13

## 2023-09-13 SDOH — ECONOMIC STABILITY: INCOME INSECURITY: HOW HARD IS IT FOR YOU TO PAY FOR THE VERY BASICS LIKE FOOD, HOUSING, MEDICAL CARE, AND HEATING?: NOT VERY HARD

## 2023-09-13 SDOH — ECONOMIC STABILITY: FOOD INSECURITY: WITHIN THE PAST 12 MONTHS, THE FOOD YOU BOUGHT JUST DIDN'T LAST AND YOU DIDN'T HAVE MONEY TO GET MORE.: NEVER TRUE

## 2023-09-13 SDOH — ECONOMIC STABILITY: FOOD INSECURITY: WITHIN THE PAST 12 MONTHS, YOU WORRIED THAT YOUR FOOD WOULD RUN OUT BEFORE YOU GOT MONEY TO BUY MORE.: NEVER TRUE

## 2023-09-13 ASSESSMENT — PATIENT HEALTH QUESTIONNAIRE - PHQ9
SUM OF ALL RESPONSES TO PHQ QUESTIONS 1-9: 0
2. FEELING DOWN, DEPRESSED OR HOPELESS: 0
SUM OF ALL RESPONSES TO PHQ QUESTIONS 1-9: 0
SUM OF ALL RESPONSES TO PHQ QUESTIONS 1-9: 0
SUM OF ALL RESPONSES TO PHQ9 QUESTIONS 1 & 2: 0
1. LITTLE INTEREST OR PLEASURE IN DOING THINGS: 0
SUM OF ALL RESPONSES TO PHQ QUESTIONS 1-9: 0

## 2023-09-13 NOTE — PROGRESS NOTES
[facility] PRIMARY CARE  Opal Gordillo M.D.  [address] [place] [address]  Ph No:  [phone]  Fax:  [phone]:  Chief Complaint   Patient presents with    Results     Patient recently had lab work done, would like to discuss results       Diabetes     Patient states blood sugars have been good- around 90. Patient checks her blood sugar once per day. Patient is compliant with medication use. Patient denies symptoms of neuropathy. Patient denies wounds that won't heal.       Hypertension     Patient does check blood pressure at home, reports their blood pressure has been good. Patient takes medications as prescribed. Denies chest pain, shortness of breath or headaches. IMPRESSION/PLAN    1. Type 2 diabetes mellitus with stage 4 chronic kidney disease, without long-term current use of insulin ([address] [place] [address])  -     [facility]  -     empagliflozin (JARDIANCE) 10 MG tablet; Take 1 tablet by mouth daily, Disp-30 tablet, R-5Normal  2. CKD stage 4 due to type 2 diabetes mellitus ([address] [place] [address])  -     [facility]  3. Anemia due to stage 4 chronic kidney disease ([address] [place] [address])  -     [facility]  4. Hyperthyroidism  -     methIMAzole (TAPAZOLE) 5 MG tablet; Take 1 tablet by mouth daily, Disp-90 tablet, R-3Normal  5. Essential hypertension with goal blood pressure less than 140/90  Assessment & Plan:   Borderline controlled, continue current medications, medication adherence emphasized and lifestyle modifications recommended  Orders:  -     lisinopril-hydroCHLOROthiazide (PRINZIDE;ZESTORETIC) 20-12.5 MG per tablet; Take 2 tablets by mouth daily, Disp-360 tablet, R-1Normal  -     carvedilol (COREG) 3.125 MG tablet; Take 1 tablet by mouth 2 times daily, Disp-60 tablet, R-5Normal  6. Hypercholesterolemia  -     rosuvastatin (CRESTOR) 20 MG tablet; Take 1 tablet by mouth nightly, Disp-90 tablet, R-1Normal      Creatinine increased to 1.7 with gfr 29.   Will

## 2023-09-14 ENCOUNTER — TELEPHONE (OUTPATIENT)
Dept: PRIMARY CARE CLINIC | Facility: CLINIC | Age: 83
End: 2023-09-14

## 2023-09-14 NOTE — TELEPHONE ENCOUNTER
Tracie Runner was too expensive. Per Dr Carlton Izaguirre, okay to finish the one month supply of jardiance and we will re evaluate when she comes back in in 1 month.

## 2023-09-14 NOTE — TELEPHONE ENCOUNTER
Pt states that one of her medications is over $500 and she can't afford that.  She would like a call back

## 2023-10-18 ENCOUNTER — OFFICE VISIT (OUTPATIENT)
Dept: PRIMARY CARE CLINIC | Facility: CLINIC | Age: 83
End: 2023-10-18
Payer: MEDICARE

## 2023-10-18 VITALS
HEART RATE: 84 BPM | WEIGHT: 164 LBS | HEIGHT: 63 IN | OXYGEN SATURATION: 99 % | BODY MASS INDEX: 29.06 KG/M2 | TEMPERATURE: 97.8 F | SYSTOLIC BLOOD PRESSURE: 178 MMHG | DIASTOLIC BLOOD PRESSURE: 69 MMHG

## 2023-10-18 DIAGNOSIS — N18.4 CKD STAGE 4 DUE TO TYPE 2 DIABETES MELLITUS (HCC): ICD-10-CM

## 2023-10-18 DIAGNOSIS — N18.4 ANEMIA DUE TO STAGE 4 CHRONIC KIDNEY DISEASE (HCC): ICD-10-CM

## 2023-10-18 DIAGNOSIS — D63.1 ANEMIA DUE TO STAGE 4 CHRONIC KIDNEY DISEASE (HCC): ICD-10-CM

## 2023-10-18 DIAGNOSIS — E11.22 TYPE 2 DIABETES MELLITUS WITH STAGE 3B CHRONIC KIDNEY DISEASE, WITHOUT LONG-TERM CURRENT USE OF INSULIN (HCC): Primary | ICD-10-CM

## 2023-10-18 DIAGNOSIS — N18.32 TYPE 2 DIABETES MELLITUS WITH STAGE 3B CHRONIC KIDNEY DISEASE, WITHOUT LONG-TERM CURRENT USE OF INSULIN (HCC): Primary | ICD-10-CM

## 2023-10-18 DIAGNOSIS — E11.22 CKD STAGE 4 DUE TO TYPE 2 DIABETES MELLITUS (HCC): ICD-10-CM

## 2023-10-18 PROCEDURE — G8399 PT W/DXA RESULTS DOCUMENT: HCPCS | Performed by: FAMILY MEDICINE

## 2023-10-18 PROCEDURE — 99213 OFFICE O/P EST LOW 20 MIN: CPT | Performed by: FAMILY MEDICINE

## 2023-10-18 PROCEDURE — 1123F ACP DISCUSS/DSCN MKR DOCD: CPT | Performed by: FAMILY MEDICINE

## 2023-10-18 PROCEDURE — 1036F TOBACCO NON-USER: CPT | Performed by: FAMILY MEDICINE

## 2023-10-18 PROCEDURE — G8484 FLU IMMUNIZE NO ADMIN: HCPCS | Performed by: FAMILY MEDICINE

## 2023-10-18 PROCEDURE — 3078F DIAST BP <80 MM HG: CPT | Performed by: FAMILY MEDICINE

## 2023-10-18 PROCEDURE — 1090F PRES/ABSN URINE INCON ASSESS: CPT | Performed by: FAMILY MEDICINE

## 2023-10-18 PROCEDURE — G8427 DOCREV CUR MEDS BY ELIG CLIN: HCPCS | Performed by: FAMILY MEDICINE

## 2023-10-18 PROCEDURE — 3077F SYST BP >= 140 MM HG: CPT | Performed by: FAMILY MEDICINE

## 2023-10-18 PROCEDURE — G8417 CALC BMI ABV UP PARAM F/U: HCPCS | Performed by: FAMILY MEDICINE

## 2023-10-18 PROCEDURE — 3044F HG A1C LEVEL LT 7.0%: CPT | Performed by: FAMILY MEDICINE

## 2023-10-18 RX ORDER — BLOOD-GLUCOSE METER
1 KIT MISCELLANEOUS DAILY
Qty: 1 KIT | Refills: 0 | Status: SHIPPED | OUTPATIENT
Start: 2023-10-18

## 2023-10-18 RX ORDER — GLUCOSAMINE HCL/CHONDROITIN SU 500-400 MG
CAPSULE ORAL
Qty: 50 STRIP | Refills: 5 | Status: SHIPPED | OUTPATIENT
Start: 2023-10-18

## 2023-10-18 RX ORDER — LANCETS 30 GAUGE
1 EACH MISCELLANEOUS DAILY
Qty: 100 EACH | Refills: 3 | Status: SHIPPED | OUTPATIENT
Start: 2023-10-18

## 2023-10-19 ENCOUNTER — TELEPHONE (OUTPATIENT)
Dept: PRIMARY CARE CLINIC | Facility: CLINIC | Age: 83
End: 2023-10-19

## 2023-10-19 NOTE — TELEPHONE ENCOUNTER
Patient called and stated that [facility] stated that there was not a code on the prescription for her blood glucose monitor.       Medication  glucose monitoring kit [00956]  glucose monitoring kit [[id number]]     Order Details  Dose: 1 kit Route: Does not apply Frequency: DAILY   Dispense Quantity: 1 kit Refills: 0          Si kit by Does not apply route daily         Start Date: 10/18/23 End Date: --   Written Date: 10/18/23 Expiration Date: 10/17/24   Providers    Authorizing Provider: Kaitlynn Meade MD NPI: [id number]   Ordering User:  Kaitlynn Meade MD          Pharmacy    [facility] [address] [place] [address] [phone] Hulan Runner [phone] [address] [address]   Phone:  [phone]  Fax:  [phone]

## 2023-11-02 ENCOUNTER — TELEPHONE (OUTPATIENT)
Dept: PRIMARY CARE CLINIC | Facility: CLINIC | Age: 83
End: 2023-11-02

## 2023-11-02 RX ORDER — GLUCOSAMINE HCL/CHONDROITIN SU 500-400 MG
CAPSULE ORAL
Qty: 50 STRIP | Refills: 5 | Status: SHIPPED | OUTPATIENT
Start: 2023-11-02

## 2023-11-02 RX ORDER — LANCETS 30 GAUGE
1 EACH MISCELLANEOUS DAILY
Qty: 100 EACH | Refills: 3 | Status: SHIPPED | OUTPATIENT
Start: 2023-11-02

## 2023-11-02 RX ORDER — BLOOD-GLUCOSE METER
1 KIT MISCELLANEOUS DAILY
Qty: 1 KIT | Refills: 0 | Status: SHIPPED | OUTPATIENT
Start: 2023-11-02

## 2023-11-07 ASSESSMENT — ENCOUNTER SYMPTOMS
TROUBLE SWALLOWING: 0
CONSTIPATION: 0
DIARRHEA: 0

## 2023-11-07 NOTE — PROGRESS NOTES
Ema Boogie MD, [facility]            Reason for visit: Follow-up of hyperthyroidism      ASSESSMENT AND PLAN:    1. Hyperthyroidism  I will have her check thyroid function today. Return in 6 months. - methIMAzole (TAPAZOLE) 5 MG tablet; Take 1 tablet by mouth daily  Dispense: 90 tablet; Refill: 3  - TSH; Future  - T4, Free; Future  - T3; Future  - Hepatic Function Panel; Future    2. Multiple thyroid nodules  Ongoing ultrasound follow-up is not necessary. Follow-up and Dispositions    Return in about 6 months (around 2024). History of Present Illness:    THYROID NODULES  Nolberto Canes is seen today in the [facility] for follow-up of thyroid nodules and hyperthyroidism. These were discovered as part of the evaluation of unintentional weight loss (she has now regained the lost weight). There is not a history of radiation to the head/neck. The patient does not have a family history of thyroid cancer. Symptoms: See review of systems below     Prior imagin2016: Iodine 123 uptake and scan ([facility])- 24 hour uptake 19%. Scan demonstrates asymmetric enlargement of the right lobe with a symmetrically increased uptake in the right lobe. 2016: Ultrasound ([facility])- Right lobe 3.4 x 2.1 x 2.4 cm, isthmus 0.2 cm, left lobe 3.3 x 1.4 x 1.8 cm.  2.1 cm complex nodule in the right lobe. 1.5 cm complex nodule in the left lobe. 3/10/2017: Ultrasound ([facility])- Right lobe 2.14 x 2.02 x 4.78 cm, isthmus 0.21 cm, left lobe 1.18 x 1.37 x 3.13 cm. Heterogeneous echotexture. Multiple nodules in both lobes. The largest are as follows: 1.61 x 1.60 x 1.73 cm anechoic colloid cyst at the right upper pole. 0.98 x 1.48 x 1.76 cm complex hypoechoic colloid nodule with increased blood flow but with no calcifications in the upper to midportion of the right lobe.   1.45 x 2.13 x 1.66 cm complex isoechoic

## 2023-11-08 ENCOUNTER — OFFICE VISIT (OUTPATIENT)
Dept: ENDOCRINOLOGY | Age: 83
End: 2023-11-08
Payer: MEDICARE

## 2023-11-08 VITALS
SYSTOLIC BLOOD PRESSURE: 142 MMHG | BODY MASS INDEX: 28.63 KG/M2 | HEIGHT: 63 IN | WEIGHT: 161.6 LBS | OXYGEN SATURATION: 99 % | DIASTOLIC BLOOD PRESSURE: 74 MMHG | HEART RATE: 84 BPM

## 2023-11-08 DIAGNOSIS — E04.2 MULTIPLE THYROID NODULES: ICD-10-CM

## 2023-11-08 DIAGNOSIS — E05.90 HYPERTHYROIDISM: ICD-10-CM

## 2023-11-08 DIAGNOSIS — E11.22 TYPE 2 DIABETES MELLITUS WITH STAGE 3B CHRONIC KIDNEY DISEASE, WITHOUT LONG-TERM CURRENT USE OF INSULIN (HCC): ICD-10-CM

## 2023-11-08 DIAGNOSIS — N18.32 TYPE 2 DIABETES MELLITUS WITH STAGE 3B CHRONIC KIDNEY DISEASE, WITHOUT LONG-TERM CURRENT USE OF INSULIN (HCC): ICD-10-CM

## 2023-11-08 DIAGNOSIS — E05.90 HYPERTHYROIDISM: Primary | ICD-10-CM

## 2023-11-08 LAB
ALBUMIN SERPL-MCNC: 3.4 G/DL (ref 3.2–4.6)
ALBUMIN/GLOB SERPL: 0.9 (ref 0.4–1.6)
ALP SERPL-CCNC: 66 U/L (ref 50–136)
ALT SERPL-CCNC: 28 U/L (ref 12–65)
AST SERPL-CCNC: 18 U/L (ref 15–37)
BILIRUB DIRECT SERPL-MCNC: <0.1 MG/DL
BILIRUB SERPL-MCNC: 0.3 MG/DL (ref 0.2–1.1)
GLOBULIN SER CALC-MCNC: 3.7 G/DL (ref 2.8–4.5)
PROT SERPL-MCNC: 7.1 G/DL (ref 6.3–8.2)
T3 SERPL-MCNC: 1.2 NG/ML (ref 0.6–1.81)
T4 FREE SERPL-MCNC: 0.9 NG/DL (ref 0.78–1.46)
TSH, 3RD GENERATION: 0.46 UIU/ML (ref 0.36–3.74)

## 2023-11-08 PROCEDURE — G8484 FLU IMMUNIZE NO ADMIN: HCPCS | Performed by: INTERNAL MEDICINE

## 2023-11-08 PROCEDURE — G8417 CALC BMI ABV UP PARAM F/U: HCPCS | Performed by: INTERNAL MEDICINE

## 2023-11-08 PROCEDURE — 99213 OFFICE O/P EST LOW 20 MIN: CPT | Performed by: INTERNAL MEDICINE

## 2023-11-08 PROCEDURE — 3078F DIAST BP <80 MM HG: CPT | Performed by: INTERNAL MEDICINE

## 2023-11-08 PROCEDURE — G8427 DOCREV CUR MEDS BY ELIG CLIN: HCPCS | Performed by: INTERNAL MEDICINE

## 2023-11-08 PROCEDURE — 1123F ACP DISCUSS/DSCN MKR DOCD: CPT | Performed by: INTERNAL MEDICINE

## 2023-11-08 PROCEDURE — 1090F PRES/ABSN URINE INCON ASSESS: CPT | Performed by: INTERNAL MEDICINE

## 2023-11-08 PROCEDURE — G8399 PT W/DXA RESULTS DOCUMENT: HCPCS | Performed by: INTERNAL MEDICINE

## 2023-11-08 PROCEDURE — 3077F SYST BP >= 140 MM HG: CPT | Performed by: INTERNAL MEDICINE

## 2023-11-08 PROCEDURE — 1036F TOBACCO NON-USER: CPT | Performed by: INTERNAL MEDICINE

## 2023-11-08 PROCEDURE — 3044F HG A1C LEVEL LT 7.0%: CPT | Performed by: INTERNAL MEDICINE

## 2023-11-08 RX ORDER — METHIMAZOLE 5 MG/1
5 TABLET ORAL DAILY
Qty: 90 TABLET | Refills: 3 | Status: SHIPPED | OUTPATIENT
Start: 2023-11-08 | End: 2023-11-09 | Stop reason: SDUPTHER

## 2023-11-08 ASSESSMENT — ENCOUNTER SYMPTOMS: VOICE CHANGE: 0

## 2023-11-09 DIAGNOSIS — E05.90 HYPERTHYROIDISM: ICD-10-CM

## 2023-11-09 RX ORDER — METHIMAZOLE 5 MG/1
5 TABLET ORAL DAILY
Qty: 90 TABLET | Refills: 3 | Status: SHIPPED | OUTPATIENT
Start: 2023-11-09

## 2023-11-09 NOTE — PROGRESS NOTES
Please let Ms. Gibson know that her thyroid levels look great.  Continue methimazole as prescribed.  I renewed her prescription with her pharmacy.

## 2024-01-02 ENCOUNTER — TELEPHONE (OUTPATIENT)
Dept: PRIMARY CARE CLINIC | Facility: CLINIC | Age: 84
End: 2024-01-02

## 2024-01-18 ENCOUNTER — NURSE ONLY (OUTPATIENT)
Dept: PRIMARY CARE CLINIC | Facility: CLINIC | Age: 84
End: 2024-01-18

## 2024-01-18 DIAGNOSIS — N18.31 STAGE 3A CHRONIC KIDNEY DISEASE (HCC): ICD-10-CM

## 2024-01-18 DIAGNOSIS — N18.31 ANEMIA DUE TO STAGE 3A CHRONIC KIDNEY DISEASE (HCC): ICD-10-CM

## 2024-01-18 DIAGNOSIS — N18.32 TYPE 2 DIABETES MELLITUS WITH STAGE 3B CHRONIC KIDNEY DISEASE, WITHOUT LONG-TERM CURRENT USE OF INSULIN (HCC): ICD-10-CM

## 2024-01-18 DIAGNOSIS — E78.00 HYPERCHOLESTEROLEMIA: ICD-10-CM

## 2024-01-18 DIAGNOSIS — E78.00 PURE HYPERCHOLESTEROLEMIA, UNSPECIFIED: ICD-10-CM

## 2024-01-18 DIAGNOSIS — E05.90 HYPERTHYROIDISM: ICD-10-CM

## 2024-01-18 DIAGNOSIS — E04.2 MULTIPLE THYROID NODULES: ICD-10-CM

## 2024-01-18 DIAGNOSIS — D63.1 ANEMIA DUE TO STAGE 3A CHRONIC KIDNEY DISEASE (HCC): ICD-10-CM

## 2024-01-18 DIAGNOSIS — E11.22 TYPE 2 DIABETES MELLITUS WITH STAGE 3B CHRONIC KIDNEY DISEASE, WITHOUT LONG-TERM CURRENT USE OF INSULIN (HCC): ICD-10-CM

## 2024-01-18 DIAGNOSIS — E05.90 HYPERTHYROIDISM: Primary | ICD-10-CM

## 2024-01-18 LAB
ALBUMIN SERPL-MCNC: 3.2 G/DL (ref 3.2–4.6)
ALBUMIN/GLOB SERPL: 0.8 (ref 0.4–1.6)
ALP SERPL-CCNC: 67 U/L (ref 50–136)
ANION GAP SERPL CALC-SCNC: 7 MMOL/L (ref 2–11)
AST SERPL-CCNC: 19 U/L (ref 15–37)
BASOPHILS # BLD: 0.1 K/UL (ref 0–0.2)
BASOPHILS NFR BLD: 1 % (ref 0–2)
BILIRUB SERPL-MCNC: 0.3 MG/DL (ref 0.2–1.1)
BUN SERPL-MCNC: 38 MG/DL (ref 8–23)
CALCIUM SERPL-MCNC: 9.7 MG/DL (ref 8.3–10.4)
CHLORIDE SERPL-SCNC: 101 MMOL/L (ref 103–113)
CHOLEST SERPL-MCNC: 138 MG/DL
CO2 SERPL-SCNC: 25 MMOL/L (ref 21–32)
CREAT SERPL-MCNC: 2.1 MG/DL (ref 0.6–1)
DIFFERENTIAL METHOD BLD: ABNORMAL
EOSINOPHIL # BLD: 0.3 K/UL (ref 0–0.8)
EOSINOPHIL NFR BLD: 4 % (ref 0.5–7.8)
ERYTHROCYTE [DISTWIDTH] IN BLOOD BY AUTOMATED COUNT: 15 % (ref 11.9–14.6)
EST. AVERAGE GLUCOSE BLD GHB EST-MCNC: 186 MG/DL
GLOBULIN SER CALC-MCNC: 4.2 G/DL (ref 2.8–4.5)
GLUCOSE SERPL-MCNC: 349 MG/DL (ref 65–100)
HBA1C MFR BLD: 8.1 % (ref 4.8–5.6)
HCT VFR BLD AUTO: 28.8 % (ref 35.8–46.3)
HDLC SERPL-MCNC: 49 MG/DL (ref 40–60)
HDLC SERPL: 2.8
HGB BLD-MCNC: 9 G/DL (ref 11.7–15.4)
IMM GRANULOCYTES # BLD AUTO: 0 K/UL (ref 0–0.5)
IMM GRANULOCYTES NFR BLD AUTO: 0 % (ref 0–5)
LDLC SERPL CALC-MCNC: 56 MG/DL
LYMPHOCYTES # BLD: 1.2 K/UL (ref 0.5–4.6)
LYMPHOCYTES NFR BLD: 19 % (ref 13–44)
MCH RBC QN AUTO: 26.3 PG (ref 26.1–32.9)
MCHC RBC AUTO-ENTMCNC: 31.3 G/DL (ref 31.4–35)
MCV RBC AUTO: 84.2 FL (ref 82–102)
MONOCYTES # BLD: 0.3 K/UL (ref 0.1–1.3)
MONOCYTES NFR BLD: 5 % (ref 4–12)
NEUTS SEG # BLD: 4.2 K/UL (ref 1.7–8.2)
NEUTS SEG NFR BLD: 71 % (ref 43–78)
NRBC # BLD: 0 K/UL (ref 0–0.2)
PLATELET # BLD AUTO: 241 K/UL (ref 150–450)
PMV BLD AUTO: 9.7 FL (ref 9.4–12.3)
POTASSIUM SERPL-SCNC: 3.5 MMOL/L (ref 3.5–5.1)
PROT SERPL-MCNC: 7.4 G/DL (ref 6.3–8.2)
RBC # BLD AUTO: 3.42 M/UL (ref 4.05–5.2)
SODIUM SERPL-SCNC: 133 MMOL/L (ref 136–146)
TRIGL SERPL-MCNC: 165 MG/DL (ref 35–150)
TSH, 3RD GENERATION: 1.14 UIU/ML (ref 0.36–3.74)
VLDLC SERPL CALC-MCNC: 33 MG/DL (ref 6–23)
WBC # BLD AUTO: 6 K/UL (ref 4.3–11.1)

## 2024-01-24 ENCOUNTER — OFFICE VISIT (OUTPATIENT)
Dept: PRIMARY CARE CLINIC | Facility: CLINIC | Age: 84
End: 2024-01-24
Payer: MEDICARE

## 2024-01-24 VITALS
OXYGEN SATURATION: 97 % | WEIGHT: 158 LBS | SYSTOLIC BLOOD PRESSURE: 129 MMHG | HEART RATE: 66 BPM | HEIGHT: 63 IN | TEMPERATURE: 97 F | DIASTOLIC BLOOD PRESSURE: 57 MMHG | BODY MASS INDEX: 28 KG/M2

## 2024-01-24 DIAGNOSIS — E78.00 HYPERCHOLESTEROLEMIA: ICD-10-CM

## 2024-01-24 DIAGNOSIS — I10 ESSENTIAL HYPERTENSION WITH GOAL BLOOD PRESSURE LESS THAN 140/90: ICD-10-CM

## 2024-01-24 DIAGNOSIS — N18.4 TYPE 2 DIABETES MELLITUS WITH STAGE 4 CHRONIC KIDNEY DISEASE, WITHOUT LONG-TERM CURRENT USE OF INSULIN (HCC): Primary | ICD-10-CM

## 2024-01-24 DIAGNOSIS — E11.22 TYPE 2 DIABETES MELLITUS WITH STAGE 4 CHRONIC KIDNEY DISEASE, WITHOUT LONG-TERM CURRENT USE OF INSULIN (HCC): Primary | ICD-10-CM

## 2024-01-24 DIAGNOSIS — D63.1 ANEMIA DUE TO STAGE 3A CHRONIC KIDNEY DISEASE (HCC): ICD-10-CM

## 2024-01-24 DIAGNOSIS — N18.31 ANEMIA DUE TO STAGE 3A CHRONIC KIDNEY DISEASE (HCC): ICD-10-CM

## 2024-01-24 DIAGNOSIS — N18.31 STAGE 3A CHRONIC KIDNEY DISEASE (HCC): ICD-10-CM

## 2024-01-24 DIAGNOSIS — I73.9 PVD (PERIPHERAL VASCULAR DISEASE) (HCC): ICD-10-CM

## 2024-01-24 PROCEDURE — G8417 CALC BMI ABV UP PARAM F/U: HCPCS | Performed by: FAMILY MEDICINE

## 2024-01-24 PROCEDURE — 3052F HG A1C>EQUAL 8.0%<EQUAL 9.0%: CPT | Performed by: FAMILY MEDICINE

## 2024-01-24 PROCEDURE — 1036F TOBACCO NON-USER: CPT | Performed by: FAMILY MEDICINE

## 2024-01-24 PROCEDURE — 3074F SYST BP LT 130 MM HG: CPT | Performed by: FAMILY MEDICINE

## 2024-01-24 PROCEDURE — 1090F PRES/ABSN URINE INCON ASSESS: CPT | Performed by: FAMILY MEDICINE

## 2024-01-24 PROCEDURE — 99214 OFFICE O/P EST MOD 30 MIN: CPT | Performed by: FAMILY MEDICINE

## 2024-01-24 PROCEDURE — G8484 FLU IMMUNIZE NO ADMIN: HCPCS | Performed by: FAMILY MEDICINE

## 2024-01-24 PROCEDURE — 3078F DIAST BP <80 MM HG: CPT | Performed by: FAMILY MEDICINE

## 2024-01-24 PROCEDURE — G8427 DOCREV CUR MEDS BY ELIG CLIN: HCPCS | Performed by: FAMILY MEDICINE

## 2024-01-24 PROCEDURE — G8399 PT W/DXA RESULTS DOCUMENT: HCPCS | Performed by: FAMILY MEDICINE

## 2024-01-24 PROCEDURE — 1123F ACP DISCUSS/DSCN MKR DOCD: CPT | Performed by: FAMILY MEDICINE

## 2024-01-24 RX ORDER — BLOOD-GLUCOSE METER
1 KIT MISCELLANEOUS DAILY
Qty: 1 KIT | Refills: 0 | Status: SHIPPED | OUTPATIENT
Start: 2024-01-24

## 2024-01-24 RX ORDER — LANCETS 30 GAUGE
1 EACH MISCELLANEOUS DAILY
Qty: 100 EACH | Refills: 5 | Status: SHIPPED | OUTPATIENT
Start: 2024-01-24

## 2024-01-24 RX ORDER — GLIPIZIDE 10 MG/1
TABLET, FILM COATED, EXTENDED RELEASE ORAL
Qty: 90 TABLET | Refills: 3 | Status: SHIPPED | OUTPATIENT
Start: 2024-01-24

## 2024-01-24 RX ORDER — AMLODIPINE BESYLATE 10 MG/1
10 TABLET ORAL DAILY
Qty: 90 TABLET | Refills: 3 | Status: SHIPPED | OUTPATIENT
Start: 2024-01-24

## 2024-01-24 RX ORDER — CARVEDILOL 3.12 MG/1
3.12 TABLET ORAL 2 TIMES DAILY
Qty: 180 TABLET | Refills: 1 | Status: SHIPPED | OUTPATIENT
Start: 2024-01-24

## 2024-01-24 RX ORDER — LISINOPRIL AND HYDROCHLOROTHIAZIDE 20; 12.5 MG/1; MG/1
2 TABLET ORAL DAILY
Qty: 360 TABLET | Refills: 3 | Status: CANCELLED | OUTPATIENT
Start: 2024-01-24

## 2024-01-24 RX ORDER — GLUCOSAMINE HCL/CHONDROITIN SU 500-400 MG
CAPSULE ORAL
Qty: 50 STRIP | Refills: 5 | Status: SHIPPED | OUTPATIENT
Start: 2024-01-24

## 2024-01-24 RX ORDER — ROSUVASTATIN CALCIUM 20 MG/1
20 TABLET, COATED ORAL NIGHTLY
Qty: 90 TABLET | Refills: 3 | Status: SHIPPED | OUTPATIENT
Start: 2024-01-24

## 2024-01-24 ASSESSMENT — PATIENT HEALTH QUESTIONNAIRE - PHQ9
SUM OF ALL RESPONSES TO PHQ QUESTIONS 1-9: 0
SUM OF ALL RESPONSES TO PHQ QUESTIONS 1-9: 0
2. FEELING DOWN, DEPRESSED OR HOPELESS: 0
SUM OF ALL RESPONSES TO PHQ QUESTIONS 1-9: 0
SUM OF ALL RESPONSES TO PHQ9 QUESTIONS 1 & 2: 0
SUM OF ALL RESPONSES TO PHQ QUESTIONS 1-9: 0
1. LITTLE INTEREST OR PLEASURE IN DOING THINGS: 0

## 2024-01-24 NOTE — ASSESSMENT & PLAN NOTE
Currently stable at 9.  Most likely related to chronic kidney disease.  Continue to follow-up with nephrology as indicated.

## 2024-01-24 NOTE — ASSESSMENT & PLAN NOTE
Well-controlled, continue current medications, medication adherence emphasized, and lifestyle modifications recommended

## 2024-01-24 NOTE — PROGRESS NOTES
[facility] PRIMARY CARE  Opal Abraham M.D.  [address] [place] [address] [place] [address]  Ph No:  [phone]  Fax:  [phone]    CHIEF COMPLAINT:  Chief Complaint   Patient presents with    Hypertension     Patient is here to follow up to hypertension, she took her medication at 10 am. She is requesting refills.    Cholesterol Problem     Patient takes her medication at night, she is requesting refills.    Discuss Labs          IMPRESSION/PLAN    1. Essential hypertension with goal blood pressure less than 140/90  -     carvedilol (COREG) 3.125 MG tablet; Take 1 tablet by mouth 2 times daily, Disp-180 tablet, R-1Normal  2. Hypercholesterolemia  -     rosuvastatin (CRESTOR) 20 MG tablet; Take 1 tablet by mouth nightly, Disp-90 tablet, R-3Normal  3. Type 2 diabetes mellitus with stage 4 chronic kidney disease, without long-term current use of insulin (HCC)  -     empagliflozin (JARDIANCE) 10 MG tablet; Take 1 tablet by mouth daily, Disp-21 tablet, R-0Sample      ***    We discussed the expected course, resolution and complications of the diagnosis(es) in detail.  Medication risks, benefits, costs, interactions, and alternatives were discussed as indicated.  I advised pt to contact the office if condition worsens, changes or fails to improve as anticipated. Pt expressed understanding with the diagnosis(es) and plan.       HISTORY OF PRESENT ILLNESS:  ***         REVIEW OF SYSTEMS:  Review of Systems    Review of systems is as stated above, otherwise is negative.    PHYSICAL EXAM:  Vital Signs - BP (!) 129/57 (Site: Left Upper Arm, Position: Sitting, Cuff Size: Small Adult)   Pulse 66   Temp 97 °F (36.1 °C) (Temporal)   Ht 1.6 m (5' 3\")   Wt 71.7 kg (158 lb)   SpO2 97%   BMI 27.99 kg/m²      Physical Exam         Opal Abraham MD            Dictated using voice recognition software. Proofread, but unrecognized voice recognition errors may exist.

## 2024-01-24 NOTE — PROGRESS NOTES
[facility] PRIMARY CARE  Opal Abraham M.D.  [address] [place] [address] [place] [address]  Ph No:  [phone]  Fax:  [phone]    CHIEF COMPLAINT:  Chief Complaint   Patient presents with    Hypertension     Patient is here to follow up to hypertension, she took her medication at 10 am. She is requesting refills.    Cholesterol Problem     Patient takes her medication at night, she is requesting refills.    Discuss Labs          IMPRESSION/PLAN    1. Type 2 diabetes mellitus with stage 4 chronic kidney disease, without long-term current use of insulin (HCC)  -     empagliflozin (JARDIANCE) 10 MG tablet; Take 1 tablet by mouth daily, Disp-21 tablet, R-0Sample  2. Essential hypertension with goal blood pressure less than 140/90  -     carvedilol (COREG) 3.125 MG tablet; Take 1 tablet by mouth 2 times daily, Disp-180 tablet, R-1Normal  3. Hypercholesterolemia  Assessment & Plan:   Well-controlled, continue current medications, medication adherence emphasized, and lifestyle modifications recommended  Orders:  -     rosuvastatin (CRESTOR) 20 MG tablet; Take 1 tablet by mouth nightly, Disp-90 tablet, R-3Normal  4. Stage 3a chronic kidney disease (HCC)  5. PVD (peripheral vascular disease) ([facility])  6. Anemia due to stage 3a chronic kidney disease (HCC)  Assessment & Plan:    Currently stable at 9.  Most likely related to chronic kidney disease.  Continue to follow-up with nephrology as indicated.      Her A1c has increased.  Most likely due to being on Jardiance.  Will restart Jardiance 10 mg once daily.  Continue remaining medications and will recheck her A1c in 3 months.  Her creatinine has increased to 2.1.  Her blood pressure is elevated.  Not sure if she needs to remain on lisinopril.  Her potassium level is still controlled.  We will contact nephrology and decide if she is to remain on this.  She was apparently advised to start Aldactone but this apparently was not started.  She will contact nephrology to

## 2024-02-08 ENCOUNTER — TELEPHONE (OUTPATIENT)
Dept: PRIMARY CARE CLINIC | Facility: CLINIC | Age: 84
End: 2024-02-08

## 2024-02-08 DIAGNOSIS — E78.00 HYPERCHOLESTEROLEMIA: ICD-10-CM

## 2024-02-08 DIAGNOSIS — E05.90 HYPERTHYROIDISM: ICD-10-CM

## 2024-02-08 DIAGNOSIS — N18.4 TYPE 2 DIABETES MELLITUS WITH STAGE 4 CHRONIC KIDNEY DISEASE, WITHOUT LONG-TERM CURRENT USE OF INSULIN (HCC): ICD-10-CM

## 2024-02-08 DIAGNOSIS — I10 ESSENTIAL HYPERTENSION WITH GOAL BLOOD PRESSURE LESS THAN 140/90: ICD-10-CM

## 2024-02-08 DIAGNOSIS — E11.22 TYPE 2 DIABETES MELLITUS WITH STAGE 4 CHRONIC KIDNEY DISEASE, WITHOUT LONG-TERM CURRENT USE OF INSULIN (HCC): ICD-10-CM

## 2024-02-08 RX ORDER — CARVEDILOL 3.12 MG/1
3.12 TABLET ORAL 2 TIMES DAILY
Qty: 180 TABLET | Refills: 1 | Status: SHIPPED | OUTPATIENT
Start: 2024-02-08

## 2024-02-08 RX ORDER — GLIPIZIDE 10 MG/1
TABLET, FILM COATED, EXTENDED RELEASE ORAL
Qty: 90 TABLET | Refills: 3 | Status: SHIPPED | OUTPATIENT
Start: 2024-02-08

## 2024-02-08 RX ORDER — LISINOPRIL AND HYDROCHLOROTHIAZIDE 20; 12.5 MG/1; MG/1
2 TABLET ORAL DAILY
Qty: 360 TABLET | Refills: 1 | Status: SHIPPED | OUTPATIENT
Start: 2024-02-08

## 2024-02-08 RX ORDER — METHIMAZOLE 5 MG/1
5 TABLET ORAL DAILY
Qty: 90 TABLET | Refills: 3 | Status: SHIPPED | OUTPATIENT
Start: 2024-02-08

## 2024-02-08 RX ORDER — ROSUVASTATIN CALCIUM 20 MG/1
20 TABLET, COATED ORAL NIGHTLY
Qty: 90 TABLET | Refills: 3 | Status: SHIPPED | OUTPATIENT
Start: 2024-02-08

## 2024-02-08 RX ORDER — AMLODIPINE BESYLATE 10 MG/1
10 TABLET ORAL DAILY
Qty: 90 TABLET | Refills: 3 | Status: SHIPPED | OUTPATIENT
Start: 2024-02-08

## 2024-02-08 NOTE — TELEPHONE ENCOUNTER
Pt [facility].

## 2024-02-13 ENCOUNTER — TELEPHONE (OUTPATIENT)
Dept: PRIMARY CARE CLINIC | Facility: CLINIC | Age: 84
End: 2024-02-13

## 2024-02-21 DIAGNOSIS — N18.4 TYPE 2 DIABETES MELLITUS WITH STAGE 4 CHRONIC KIDNEY DISEASE, WITHOUT LONG-TERM CURRENT USE OF INSULIN (HCC): ICD-10-CM

## 2024-02-21 DIAGNOSIS — E11.22 TYPE 2 DIABETES MELLITUS WITH STAGE 4 CHRONIC KIDNEY DISEASE, WITHOUT LONG-TERM CURRENT USE OF INSULIN (HCC): ICD-10-CM

## 2024-02-21 NOTE — TELEPHONE ENCOUNTER
----- Message from Regina COLE JOSY Clark - NP sent at 2/21/2024 10:12 AM EST -----  Yony Kumari kinza [place] [facility] Clinical Staff  Subject: Message to Provider    QUESTIONS  Information for Provider? Patient is asking for Jocelyne to call her back  please .  ---------------------------------------------------------------------------  --------------  CALL BACK INFO  [phone]; OK to leave message on voicemail  ---------------------------------------------------------------------------  --------------  SCRIPT ANSWERS  Relationship to Patient? Self

## 2024-02-21 NOTE — TELEPHONE ENCOUNTER
Patient requesting samples of Jardiance, she cannot afford to pay $500 month. Ok per Kelly Clark for samples. Samples at . Requested medication pended for PCP approval.

## 2024-04-16 DIAGNOSIS — E78.00 HYPERCHOLESTEROLEMIA: ICD-10-CM

## 2024-04-16 DIAGNOSIS — I10 ESSENTIAL HYPERTENSION WITH GOAL BLOOD PRESSURE LESS THAN 140/90: ICD-10-CM

## 2024-04-16 DIAGNOSIS — N18.4 TYPE 2 DIABETES MELLITUS WITH STAGE 4 CHRONIC KIDNEY DISEASE, WITHOUT LONG-TERM CURRENT USE OF INSULIN (HCC): Primary | ICD-10-CM

## 2024-04-16 DIAGNOSIS — E05.90 HYPERTHYROIDISM: ICD-10-CM

## 2024-04-16 DIAGNOSIS — E11.22 TYPE 2 DIABETES MELLITUS WITH STAGE 4 CHRONIC KIDNEY DISEASE, WITHOUT LONG-TERM CURRENT USE OF INSULIN (HCC): Primary | ICD-10-CM

## 2024-04-17 DIAGNOSIS — E78.00 HYPERCHOLESTEROLEMIA: ICD-10-CM

## 2024-04-17 DIAGNOSIS — N18.4 TYPE 2 DIABETES MELLITUS WITH STAGE 4 CHRONIC KIDNEY DISEASE, WITHOUT LONG-TERM CURRENT USE OF INSULIN (HCC): ICD-10-CM

## 2024-04-17 DIAGNOSIS — E05.90 HYPERTHYROIDISM: ICD-10-CM

## 2024-04-17 DIAGNOSIS — E11.22 TYPE 2 DIABETES MELLITUS WITH STAGE 4 CHRONIC KIDNEY DISEASE, WITHOUT LONG-TERM CURRENT USE OF INSULIN (HCC): ICD-10-CM

## 2024-04-17 DIAGNOSIS — I10 ESSENTIAL HYPERTENSION WITH GOAL BLOOD PRESSURE LESS THAN 140/90: ICD-10-CM

## 2024-04-17 LAB
ALBUMIN SERPL-MCNC: 3.4 G/DL (ref 3.2–4.6)
ALBUMIN/GLOB SERPL: 0.8 (ref 0.4–1.6)
ALP SERPL-CCNC: 60 U/L (ref 50–136)
ALT SERPL-CCNC: 22 U/L (ref 12–65)
ANION GAP SERPL CALC-SCNC: 6 MMOL/L (ref 2–11)
AST SERPL-CCNC: 22 U/L (ref 15–37)
BASOPHILS # BLD: 0.1 K/UL (ref 0–0.2)
BASOPHILS NFR BLD: 1 % (ref 0–2)
BILIRUB SERPL-MCNC: 0.3 MG/DL (ref 0.2–1.1)
BUN SERPL-MCNC: 38 MG/DL (ref 8–23)
CALCIUM SERPL-MCNC: 10 MG/DL (ref 8.3–10.4)
CHLORIDE SERPL-SCNC: 107 MMOL/L (ref 103–113)
CHOLEST SERPL-MCNC: 155 MG/DL
CO2 SERPL-SCNC: 25 MMOL/L (ref 21–32)
CREAT SERPL-MCNC: 2.1 MG/DL (ref 0.6–1)
DIFFERENTIAL METHOD BLD: ABNORMAL
EOSINOPHIL # BLD: 0.2 K/UL (ref 0–0.8)
EOSINOPHIL NFR BLD: 3 % (ref 0.5–7.8)
ERYTHROCYTE [DISTWIDTH] IN BLOOD BY AUTOMATED COUNT: 15.9 % (ref 11.9–14.6)
GLOBULIN SER CALC-MCNC: 4.1 G/DL (ref 2.8–4.5)
GLUCOSE SERPL-MCNC: 112 MG/DL (ref 65–100)
HCT VFR BLD AUTO: 28.4 % (ref 35.8–46.3)
HDLC SERPL-MCNC: 43 MG/DL (ref 40–60)
HDLC SERPL: 3.6
HGB BLD-MCNC: 8.7 G/DL (ref 11.7–15.4)
IMM GRANULOCYTES # BLD AUTO: 0 K/UL (ref 0–0.5)
IMM GRANULOCYTES NFR BLD AUTO: 0 % (ref 0–5)
LDLC SERPL CALC-MCNC: 90.2 MG/DL
LYMPHOCYTES # BLD: 1.5 K/UL (ref 0.5–4.6)
LYMPHOCYTES NFR BLD: 26 % (ref 13–44)
MCH RBC QN AUTO: 26.9 PG (ref 26.1–32.9)
MCHC RBC AUTO-ENTMCNC: 30.6 G/DL (ref 31.4–35)
MCV RBC AUTO: 87.9 FL (ref 82–102)
MONOCYTES # BLD: 0.5 K/UL (ref 0.1–1.3)
MONOCYTES NFR BLD: 8 % (ref 4–12)
NEUTS SEG # BLD: 3.5 K/UL (ref 1.7–8.2)
NEUTS SEG NFR BLD: 62 % (ref 43–78)
NRBC # BLD: 0 K/UL (ref 0–0.2)
PLATELET # BLD AUTO: 248 K/UL (ref 150–450)
PMV BLD AUTO: 9.7 FL (ref 9.4–12.3)
POTASSIUM SERPL-SCNC: 4.4 MMOL/L (ref 3.5–5.1)
PROT SERPL-MCNC: 7.5 G/DL (ref 6.3–8.2)
RBC # BLD AUTO: 3.23 M/UL (ref 4.05–5.2)
SODIUM SERPL-SCNC: 138 MMOL/L (ref 136–146)
TRIGL SERPL-MCNC: 109 MG/DL (ref 35–150)
TSH, 3RD GENERATION: 3.35 UIU/ML (ref 0.36–3.74)
VLDLC SERPL CALC-MCNC: 21.8 MG/DL (ref 6–23)
WBC # BLD AUTO: 5.7 K/UL (ref 4.3–11.1)

## 2024-04-18 LAB
EST. AVERAGE GLUCOSE BLD GHB EST-MCNC: 186 MG/DL
HBA1C MFR BLD: 8.1 % (ref 4.8–5.6)

## 2024-05-01 ENCOUNTER — TELEPHONE (OUTPATIENT)
Dept: PRIMARY CARE CLINIC | Facility: CLINIC | Age: 84
End: 2024-05-01

## 2024-05-01 ENCOUNTER — OFFICE VISIT (OUTPATIENT)
Dept: PRIMARY CARE CLINIC | Facility: CLINIC | Age: 84
End: 2024-05-01

## 2024-05-01 VITALS
DIASTOLIC BLOOD PRESSURE: 49 MMHG | HEART RATE: 77 BPM | OXYGEN SATURATION: 98 % | SYSTOLIC BLOOD PRESSURE: 142 MMHG | TEMPERATURE: 97.6 F | BODY MASS INDEX: 28.53 KG/M2 | WEIGHT: 161 LBS | HEIGHT: 63 IN

## 2024-05-01 DIAGNOSIS — M54.32 LEFT SIDED SCIATICA: ICD-10-CM

## 2024-05-01 DIAGNOSIS — Z00.00 MEDICARE ANNUAL WELLNESS VISIT, SUBSEQUENT: Primary | ICD-10-CM

## 2024-05-01 DIAGNOSIS — N18.4 TYPE 2 DIABETES MELLITUS WITH STAGE 4 CHRONIC KIDNEY DISEASE, WITHOUT LONG-TERM CURRENT USE OF INSULIN (HCC): ICD-10-CM

## 2024-05-01 DIAGNOSIS — N18.4 CKD (CHRONIC KIDNEY DISEASE) STAGE 4, GFR 15-29 ML/MIN (HCC): ICD-10-CM

## 2024-05-01 DIAGNOSIS — E04.2 MULTIPLE THYROID NODULES: ICD-10-CM

## 2024-05-01 DIAGNOSIS — E11.22 TYPE 2 DIABETES MELLITUS WITH STAGE 4 CHRONIC KIDNEY DISEASE, WITHOUT LONG-TERM CURRENT USE OF INSULIN (HCC): ICD-10-CM

## 2024-05-01 DIAGNOSIS — D63.1 ANEMIA DUE TO STAGE 4 CHRONIC KIDNEY DISEASE (HCC): ICD-10-CM

## 2024-05-01 DIAGNOSIS — N18.4 ANEMIA DUE TO STAGE 4 CHRONIC KIDNEY DISEASE (HCC): ICD-10-CM

## 2024-05-01 DIAGNOSIS — I10 ESSENTIAL HYPERTENSION WITH GOAL BLOOD PRESSURE LESS THAN 140/90: ICD-10-CM

## 2024-05-01 RX ORDER — ACYCLOVIR 400 MG/1
TABLET ORAL
Qty: 2 EACH | Refills: 5 | Status: SHIPPED | OUTPATIENT
Start: 2024-05-01

## 2024-05-01 RX ORDER — INSULIN GLARGINE 100 [IU]/ML
10 INJECTION, SOLUTION SUBCUTANEOUS NIGHTLY
Qty: 3 ADJUSTABLE DOSE PRE-FILLED PEN SYRINGE | Refills: 3 | Status: SHIPPED | OUTPATIENT
Start: 2024-05-01 | End: 2024-05-02

## 2024-05-01 RX ORDER — TRAMADOL HYDROCHLORIDE 50 MG/1
50 TABLET ORAL EVERY 8 HOURS PRN
Qty: 30 TABLET | Refills: 2 | Status: SHIPPED | OUTPATIENT
Start: 2024-05-01 | End: 2024-05-31

## 2024-05-01 RX ORDER — ACYCLOVIR 400 MG/1
TABLET ORAL
Qty: 1 EACH | Refills: 5 | Status: SHIPPED | OUTPATIENT
Start: 2024-05-01

## 2024-05-01 ASSESSMENT — PATIENT HEALTH QUESTIONNAIRE - PHQ9
SUM OF ALL RESPONSES TO PHQ QUESTIONS 1-9: 0
SUM OF ALL RESPONSES TO PHQ QUESTIONS 1-9: 0
SUM OF ALL RESPONSES TO PHQ9 QUESTIONS 1 & 2: 0
SUM OF ALL RESPONSES TO PHQ QUESTIONS 1-9: 0
1. LITTLE INTEREST OR PLEASURE IN DOING THINGS: NOT AT ALL
2. FEELING DOWN, DEPRESSED OR HOPELESS: NOT AT ALL
SUM OF ALL RESPONSES TO PHQ QUESTIONS 1-9: 0

## 2024-05-01 NOTE — TELEPHONE ENCOUNTER
[facility] in [place] has a question - there is a prescription for tramadoll and pt has never had it before - needs to know if it's acute or chronic for pain    [phone]

## 2024-05-01 NOTE — PROGRESS NOTES
Medicare Annual Wellness Visit    Nguyen Gibson is here for Medicare AWV (Patient is here today for a Medicare Wellness.), Back Pain (Patient presents with left low back pain that goes down leg x 2 weeks. She has been taking Tylenol and applying heat with little relief. She also has used a Salonpas.), and Discuss Labs (Patient is here togo over lab results.)    Assessment & Plan   Medicare annual wellness visit, subsequent  CKD (chronic kidney disease) stage 4, GFR 15-29 ml/min ([facility])  Type 2 diabetes mellitus with stage 4 chronic kidney disease, without long-term current use of insulin ([facility])  Anemia due to stage 4 chronic kidney disease ([facility])  Left sided sciatica  -     XR LUMBAR SPINE (2-3 VIEWS); Future  -     BS - Physical Therapy, [facility]  -     traMADol (ULTRAM) 50 MG tablet; Take 1 tablet by mouth every 8 hours as needed for Pain for up to 30 days. Max Daily Amount: 150 mg, Disp-30 tablet, R-2Normal  Essential hypertension with goal blood pressure less than 140/90  Multiple thyroid nodules      We are adding lantus insulin 10 units daily.  She will continue to monitor bs before meals.  We will adjust as necessary.  Continue jardience 10mg and continue glipizide for now.  Call if any low readings.  Will send rx for dexcom cgm as pt has high risk for hypoglycemia.  Labs are all stable.  Continue to fu with nephrology for managament of ckd.  Avoid nsaids  Gave tramadol 50 mg to take as needed for severe pain.  I am getting an x-ray of her lumbar spine.  Referring to physical therapy for now.      Recommendations for Preventive Services Due: see orders and patient instructions/AVS.  Recommended screening schedule for the next 5-10 years is provided to the patient in written form: see Patient Instructions/AVS.     Return in about 3 months (around 8/1/2024), or if symptoms worsen or fail to improve, for labs 1 week prior to visit.     Subjective   The following acute and/or chronic problems

## 2024-05-02 ENCOUNTER — TELEPHONE (OUTPATIENT)
Dept: PRIMARY CARE CLINIC | Facility: CLINIC | Age: 84
End: 2024-05-02

## 2024-05-02 RX ORDER — INSULIN GLARGINE 100 [IU]/ML
10 INJECTION, SOLUTION SUBCUTANEOUS NIGHTLY
Qty: 5 ADJUSTABLE DOSE PRE-FILLED PEN SYRINGE | Refills: 3 | Status: SHIPPED | OUTPATIENT
Start: 2024-05-02

## 2024-05-06 ENCOUNTER — HOSPITAL ENCOUNTER (OUTPATIENT)
Dept: GENERAL RADIOLOGY | Age: 84
Discharge: HOME | End: 2024-05-09
Payer: MEDICARE

## 2024-05-06 DIAGNOSIS — M54.32 LEFT SIDED SCIATICA: ICD-10-CM

## 2024-05-06 PROCEDURE — 72100 X-RAY EXAM L-S SPINE 2/3 VWS: CPT

## 2024-05-15 ENCOUNTER — OFFICE VISIT (OUTPATIENT)
Dept: ENDOCRINOLOGY | Age: 84
End: 2024-05-15
Payer: MEDICARE

## 2024-05-15 VITALS
BODY MASS INDEX: 28.52 KG/M2 | SYSTOLIC BLOOD PRESSURE: 148 MMHG | WEIGHT: 161 LBS | DIASTOLIC BLOOD PRESSURE: 64 MMHG | HEART RATE: 65 BPM

## 2024-05-15 DIAGNOSIS — E04.2 MULTIPLE THYROID NODULES: ICD-10-CM

## 2024-05-15 DIAGNOSIS — E05.90 HYPERTHYROIDISM: Primary | ICD-10-CM

## 2024-05-15 PROCEDURE — 99214 OFFICE O/P EST MOD 30 MIN: CPT | Performed by: INTERNAL MEDICINE

## 2024-05-15 PROCEDURE — 1123F ACP DISCUSS/DSCN MKR DOCD: CPT | Performed by: INTERNAL MEDICINE

## 2024-05-15 PROCEDURE — G8417 CALC BMI ABV UP PARAM F/U: HCPCS | Performed by: INTERNAL MEDICINE

## 2024-05-15 PROCEDURE — 1090F PRES/ABSN URINE INCON ASSESS: CPT | Performed by: INTERNAL MEDICINE

## 2024-05-15 PROCEDURE — G8427 DOCREV CUR MEDS BY ELIG CLIN: HCPCS | Performed by: INTERNAL MEDICINE

## 2024-05-15 PROCEDURE — G2211 COMPLEX E/M VISIT ADD ON: HCPCS | Performed by: INTERNAL MEDICINE

## 2024-05-15 PROCEDURE — 1036F TOBACCO NON-USER: CPT | Performed by: INTERNAL MEDICINE

## 2024-05-15 RX ORDER — METHIMAZOLE 5 MG/1
5 TABLET ORAL DAILY
Qty: 90 TABLET | Refills: 3
Start: 2024-05-15

## 2024-05-15 ASSESSMENT — ENCOUNTER SYMPTOMS
DIARRHEA: 0
BACK PAIN: 1
VOICE CHANGE: 0
CONSTIPATION: 0
TROUBLE SWALLOWING: 0

## 2024-05-15 NOTE — PROGRESS NOTES
Musculoskeletal:  Positive for back pain.   Psychiatric/Behavioral:  Negative for sleep disturbance.        BP (!) 148/64 (Site: Right Upper Arm, Position: Sitting, Cuff Size: Large Adult)   Pulse 65   Wt 73 kg (161 lb)   BMI 28.52 kg/m²   Wt Readings from Last 3 Encounters:   05/15/24 73 kg (161 lb)   05/01/24 73 kg (161 lb)   01/24/24 71.7 kg (158 lb)       Physical Exam  HENT:      Head: Normocephalic.   Neck:      Thyroid: No thyroid mass or thyromegaly.   Cardiovascular:      Rate and Rhythm: Normal rate.   Pulmonary:      Effort: No respiratory distress.      Breath sounds: Normal breath sounds.   Neurological:      Mental Status: She is alert.   Psychiatric:         Mood and Affect: Mood normal.         Behavior: Behavior normal.         Orders Placed This Encounter   Procedures    TSH     Standing Status:   Future     Standing Expiration Date:   5/15/2025    T4, Free     Standing Status:   Future     Standing Expiration Date:   5/15/2025    T3     Standing Status:   Future     Standing Expiration Date:   5/15/2025    Hepatic Function Panel     Standing Status:   Future     Standing Expiration Date:   5/15/2025       Current Outpatient Medications   Medication Sig Dispense Refill    methIMAzole (TAPAZOLE) 5 MG tablet Take 1 tablet by mouth daily 90 tablet 3    insulin glargine (BASAGLAR KWIKPEN) 100 UNIT/ML injection pen Inject 10 Units into the skin nightly 5 Adjustable Dose Pre-filled Pen Syringe 3    Insulin Pen Needle 32G X 6 MM MISC Use daily as indicated 100 each 3    traMADol (ULTRAM) 50 MG tablet Take 1 tablet by mouth every 8 hours as needed for Pain for up to 30 days. Max Daily Amount: 150 mg 30 tablet 2    empagliflozin (JARDIANCE) 10 MG tablet Take 1 tablet by mouth daily 49 tablet 0    glipiZIDE (GLIPIZIDE XL) 10 MG extended release tablet TAKE 1 TABLET DAILY FOR    TYPE 2 DIABETES MELLITUS 90 tablet 3    amLODIPine (NORVASC) 10 MG tablet Take 1 tablet by mouth daily 90 tablet 3

## 2024-05-23 NOTE — THERAPY EVALUATION
Nguyen Gibson  : 1940  Primary: Medicare Part A And B (Medicare)  Secondary: AET[employer] SENIOR MEDICARE SUPP [facility]  Enzo PEDERSON  [address]  [facility] [address]  Phone: [phone]  Fax: [phone] Plan Frequency: 1-2x per week as needed. will start with 1x per week, if she needs more practice with HEP/manual intervention, will increase to 2x per week.    Plan of Care/Certification Expiration Date: 24        Plan of Care/Certification Expiration Date:  Plan of Care/Certification Expiration Date: 24    Frequency/Duration: Plan Frequency: 1-2x per week as needed. will start with 1x per week, if she needs more practice with HEP/manual intervention, will increase to 2x per week.      Time In/Out:   Time In: 1235  Time Out: 1330      PT Visit Info:         Visit Count:  1                OUTPATIENT PHYSICAL THERAPY:             Initial Assessment 2024               Episode (low back pain)         Treatment Diagnosis:     Sciatica, left side  Difficulty in walking, not elsewhere classified  Back stiffness  Stiffness of left knee, not elsewhere classified  Medical/Referring Diagnosis:    Left sided sciatica [M54.32]    Referring Physician:  Opal Abraham MD MD Orders:  PT Eval and Treat   Return MD Appt:  24  Date of Onset:  Onset Date: 24 (low back pain with anterolesthesis)     Allergies:  Atorvastatin and Hydrocodone-acetaminophen  Restrictions/Precautions:    Type 2 Diabetes      Medications Last Reviewed:  2024     SUBJECTIVE   History of Injury/Illness (Reason for Referral):  Pt with chronic low level low back pain that starting increasing about 3 months ago. C/o sharp pain in her tailbone but not down leg.  Standing and walking makes the pain worse and heating her back helps her pain. Pain is described as a pressure/tightness. Her pain also affects her sleep and ability to keep up with her home, dress, general

## 2024-05-24 ENCOUNTER — HOSPITAL ENCOUNTER (OUTPATIENT)
Dept: PHYSICAL THERAPY | Age: 84
Setting detail: RECURRING SERIES
Discharge: HOME | End: 2024-05-27
Payer: MEDICARE

## 2024-05-24 DIAGNOSIS — R26.2 DIFFICULTY IN WALKING, NOT ELSEWHERE CLASSIFIED: ICD-10-CM

## 2024-05-24 DIAGNOSIS — M25.69 BACK STIFFNESS: ICD-10-CM

## 2024-05-24 DIAGNOSIS — M54.32 SCIATICA, LEFT SIDE: Primary | ICD-10-CM

## 2024-05-24 DIAGNOSIS — M25.662 STIFFNESS OF LEFT KNEE, NOT ELSEWHERE CLASSIFIED: ICD-10-CM

## 2024-05-24 PROCEDURE — 97110 THERAPEUTIC EXERCISES: CPT

## 2024-05-24 PROCEDURE — 97161 PT EVAL LOW COMPLEX 20 MIN: CPT

## 2024-05-24 PROCEDURE — 97140 MANUAL THERAPY 1/> REGIONS: CPT

## 2024-05-24 ASSESSMENT — PAIN SCALES - GENERAL: PAINLEVEL_OUTOF10: 7

## 2024-05-27 NOTE — PROGRESS NOTES
Nguyen Gibson  : 1940  Primary: Medicare Part A And B (Medicare)  Secondary: AETNA SENIOR MEDICARE SUPP [facility] [place]  Enzo PEDERSON  [facility] [address]  [facility] [address]  Phone: [phone]  Fax: [phone] Plan Frequency: 1-2x per week as needed. will start with 1x per week, if she needs more practice with HEP/manual intervention, will increase to 2x per week.    Plan of Care/Certification Expiration Date: 24        Plan of Care/Certification Expiration Date:  Plan of Care/Certification Expiration Date: 24    Frequency/Duration:   Plan Frequency: 1-2x per week as needed. will start with 1x per week, if she needs more practice with HEP/manual intervention, will increase to 2x per week.      Time In/Out:   Time In: 1235  Time Out: 1330      PT Visit Info:         Visit Count:  1    OUTPATIENT PHYSICAL THERAPY:   Treatment Note 2024       Episode  (low back pain)               Treatment Diagnosis:    Sciatica, left side  Difficulty in walking, not elsewhere classified  Back stiffness  Stiffness of left knee, not elsewhere classified  Medical/Referring Diagnosis:    Left sided sciatica [M54.32]    Referring Physician:  Opal Abraham MD MD Orders:  PT Eval and Treat   Return MD Appt:  24   Date of Onset:  Onset Date: 24 (low back pain with anterolesthesis)     Allergies:   Atorvastatin and Hydrocodone-acetaminophen  Restrictions/Precautions:   Type 2 Diabetes      Interventions Planned (Treatment may consist of any combination of the following):     See Assessment Note    Subjective Comments:   At IE: Pt c/o pain in midback that is chronic but February started having pain in L LE and increasing pain in back that is debilitating.   Initial Pain Level::     7/10  Post Session Pain Level:       4/10  Medications Last Reviewed:  2024  Updated Objective Findings:  See Evaluation Note from today  Treatment   THERAPEUTIC EXERCISE: (15

## 2024-05-30 ENCOUNTER — HOSPITAL ENCOUNTER (OUTPATIENT)
Dept: PHYSICAL THERAPY | Age: 84
Setting detail: RECURRING SERIES
End: 2024-05-30
Payer: MEDICARE

## 2024-05-30 PROCEDURE — 97110 THERAPEUTIC EXERCISES: CPT

## 2024-05-30 ASSESSMENT — PAIN SCALES - GENERAL: PAINLEVEL_OUTOF10: 3

## 2024-05-30 NOTE — PROGRESS NOTES
Nguyen Gibson  : 1940  Primary: Medicare Part A And B (Medicare)  Secondary: AETNA SENIOR MEDICARE SUPP [facility] [place]  Enzo PEDERSON  [facility] [address]  [facility] [address]  Phone: [phone]  Fax: [phone] Plan Frequency: 1-2x per week as needed. will start with 1x per week, if she needs more practice with HEP/manual intervention, will increase to 2x per week.    Plan of Care/Certification Expiration Date: 24        Plan of Care/Certification Expiration Date:  Plan of Care/Certification Expiration Date: 24    Frequency/Duration:   Plan Frequency: 1-2x per week as needed. will start with 1x per week, if she needs more practice with HEP/manual intervention, will increase to 2x per week.      Time In/Out:   Time In: 803  Time Out: 854      PT Visit Info:         Visit Count:  2    OUTPATIENT PHYSICAL THERAPY:   Treatment Note 2024       Episode  (low back pain)               Treatment Diagnosis:    Sciatica, left side  Difficulty in walking, not elsewhere classified  Back stiffness  Stiffness of left knee, not elsewhere classified  Medical/Referring Diagnosis:    Left sided sciatica [M54.32]    Referring Physician:  Opal Abraham MD MD Orders:  PT Eval and Treat   Return MD Appt:  24   Date of Onset:  Onset Date: 24 (low back pain with anterolesthesis)     Allergies:   Atorvastatin and Hydrocodone-acetaminophen  Restrictions/Precautions:   Type 2 Diabetes      Interventions Planned (Treatment may consist of any combination of the following):     See Assessment Note    Subjective Comments:   Pt reports has been doing her exercises at home and they help control her pain.   Initial Pain Level::     3/10  Post Session Pain Level:       1/10  Medications Last Reviewed:  2024  Updated Objective Findings:   Posture: mild forward flexed trunk observed in standing.  Treatment   THERAPEUTIC EXERCISE: (40 minutes):    Exercises per grid

## 2024-06-06 ENCOUNTER — HOSPITAL ENCOUNTER (OUTPATIENT)
Dept: PHYSICAL THERAPY | Age: 84
Setting detail: RECURRING SERIES
Discharge: HOME | End: 2024-06-09
Payer: MEDICARE

## 2024-06-06 PROCEDURE — 97110 THERAPEUTIC EXERCISES: CPT

## 2024-06-06 ASSESSMENT — PAIN SCALES - GENERAL: PAINLEVEL_OUTOF10: 4

## 2024-06-06 NOTE — PROGRESS NOTES
Nguyen Gibson  : 1940  Primary: Medicare Part A And B (Medicare)  Secondary: AETNA SENIOR MEDICARE SUPP [facility] [place]  Enzo PEDERSON  [facility] A  [facility] [address]  Phone: [phone]  Fax: [phone] Plan Frequency: 1-2x per week as needed. will start with 1x per week, if she needs more practice with HEP/manual intervention, will increase to 2x per week.    Plan of Care/Certification Expiration Date: 24        Plan of Care/Certification Expiration Date:  Plan of Care/Certification Expiration Date: 24    Frequency/Duration:   Plan Frequency: 1-2x per week as needed. will start with 1x per week, if she needs more practice with HEP/manual intervention, will increase to 2x per week.      Time In/Out:   Time In: 1330  Time Out: 1430      PT Visit Info:         Visit Count:  3    OUTPATIENT PHYSICAL THERAPY:   Treatment Note 2024       Episode  (low back pain)               Treatment Diagnosis:    Sciatica, left side  Difficulty in walking, not elsewhere classified  Back stiffness  Stiffness of left knee, not elsewhere classified  Medical/Referring Diagnosis:    Left sided sciatica [M54.32]    Referring Physician:  Opal Abraham MD MD Orders:  PT Eval and Treat   Return MD Appt:  24   Date of Onset:  Onset Date: 24 (low back pain with anterolesthesis)     Allergies:   Atorvastatin and Hydrocodone-acetaminophen  Restrictions/Precautions:   Type 2 Diabetes      Interventions Planned (Treatment may consist of any combination of the following):     See Assessment Note    Subjective Comments:   Pt reported left knee pain.   Initial Pain Level::     4/10  Post Session Pain Level:       0/10  Medications Last Reviewed:  2024  Updated Objective Findings:   Pt. Entered clinic with slow steady gait and minimal forward flexion.   Treatment   THERAPEUTIC EXERCISE: (55 minutes):    Exercises per grid below to improve mobility, strength, and

## 2024-06-13 ENCOUNTER — HOSPITAL ENCOUNTER (OUTPATIENT)
Dept: PHYSICAL THERAPY | Age: 84
Setting detail: RECURRING SERIES
Discharge: HOME | End: 2024-06-16
Payer: MEDICARE

## 2024-06-13 PROCEDURE — 97140 MANUAL THERAPY 1/> REGIONS: CPT

## 2024-06-13 PROCEDURE — 97110 THERAPEUTIC EXERCISES: CPT

## 2024-06-13 ASSESSMENT — PAIN SCALES - GENERAL: PAINLEVEL_OUTOF10: 5

## 2024-06-13 NOTE — PROGRESS NOTES
Nguyen Gibson  : 1940  Primary: Medicare Part A And B (Medicare)  Secondary: AETNA SENIOR MEDICARE SUPP [facility] [place]  Enzo PEDERSON  [address]  [facility] [address]  Phone: [phone]  Fax: [phone] Plan Frequency: 1-2x per week as needed. will start with 1x per week, if she needs more practice with HEP/manual intervention, will increase to 2x per week.    Plan of Care/Certification Expiration Date: 24        Plan of Care/Certification Expiration Date:  Plan of Care/Certification Expiration Date: 24    Frequency/Duration:   Plan Frequency: 1-2x per week as needed. will start with 1x per week, if she needs more practice with HEP/manual intervention, will increase to 2x per week.      Time In/Out:          PT Visit Info:         Visit Count:  4    OUTPATIENT PHYSICAL THERAPY:   Treatment Note 2024       Episode  (low back pain)               Treatment Diagnosis:    Sciatica, left side  Difficulty in walking, not elsewhere classified  Back stiffness  Stiffness of left knee, not elsewhere classified  Medical/Referring Diagnosis:    Left sided sciatica [M54.32]    Referring Physician:  Opal Abraham MD MD Orders:  PT Eval and Treat   Return MD Appt:  24   Date of Onset:  Onset Date: 24 (low back pain with anterolesthesis)     Allergies:   Atorvastatin and Hydrocodone-acetaminophen  Restrictions/Precautions:   Type 2 Diabetes      Interventions Planned (Treatment may consist of any combination of the following):     See Assessment Note  Subjective Comments:   Pt reported left knee pain.   Initial Pain Level::     5/10  Post Session Pain Level:       3 /10  Medications Last Reviewed:  2024  Updated Objective Findings:   Pt. Continues to ambulate with antalgic gait and trunk slightly flexed.   Treatment   THERAPEUTIC EXERCISE: (55 minutes):    Exercises per grid below to improve mobility, strength, and balance.

## 2024-06-21 ENCOUNTER — HOSPITAL ENCOUNTER (OUTPATIENT)
Dept: PHYSICAL THERAPY | Age: 84
Setting detail: RECURRING SERIES
Discharge: HOME | End: 2024-06-24
Payer: MEDICARE

## 2024-06-21 PROCEDURE — 97140 MANUAL THERAPY 1/> REGIONS: CPT

## 2024-06-21 PROCEDURE — 97110 THERAPEUTIC EXERCISES: CPT

## 2024-06-21 NOTE — PROGRESS NOTES
Nguyen Gibson  : 1940  Primary: Medicare Part A And B (Medicare)  Secondary: AETNA SENIOR MEDICARE SUPP [facility] [place]  [address] P[address]ERSON  [address]  [facility] [address]  Phone: [phone]  Fax: [phone] Plan Frequency: 1-2x per week as needed. will start with 1x per week, if she needs more practice with HEP/manual intervention, will increase to 2x per week.    Plan of Care/Certification Expiration Date: 24        Plan of Care/Certification Expiration Date:  Plan of Care/Certification Expiration Date: 24    Frequency/Duration:   Plan Frequency: 1-2x per week as needed. will start with 1x per week, if she needs more practice with HEP/manual intervention, will increase to 2x per week.      Time In/Out:          PT Visit Info:         Visit Count:  4    OUTPATIENT PHYSICAL THERAPY:   Treatment Note 2024       Episode  (low back pain)               Treatment Diagnosis:    Sciatica, left side  Difficulty in walking, not elsewhere classified  Back stiffness  Stiffness of left knee, not elsewhere classified  Medical/Referring Diagnosis:    Left sided sciatica [M54.32]    Referring Physician:  Opal Abraham MD MD Orders:  PT Eval and Treat   Return MD Appt:  24   Date of Onset:  Onset Date: 24 (low back pain with anterolesthesis)     Allergies:   Atorvastatin and Hydrocodone-acetaminophen  Restrictions/Precautions:   Type 2 Diabetes      Interventions Planned (Treatment may consist of any combination of the following):     See Assessment Note  Subjective Comments:   Pt reported left knee pain still there but back is feeling better. Sleeping with pillow support is helpful to back and she is sleeping better. L knee hurts mostly in back of her knee and in hamstring area.   Initial Pain Level::      1/10 for back, 4/10 for L knee  Post Session Pain Level:        0/10 for back; 3/10 for L knee  Medications Last Reviewed:  2024  Updated

## 2024-06-25 ENCOUNTER — HOSPITAL ENCOUNTER (OUTPATIENT)
Dept: PHYSICAL THERAPY | Age: 84
Setting detail: RECURRING SERIES
Discharge: HOME | End: 2024-06-28
Payer: MEDICARE

## 2024-06-25 PROCEDURE — 97110 THERAPEUTIC EXERCISES: CPT

## 2024-06-25 PROCEDURE — 97140 MANUAL THERAPY 1/> REGIONS: CPT

## 2024-06-25 NOTE — PROGRESS NOTES
Nguyen Gibson  : 1940  Primary: Medicare Part A And B (Medicare)  Secondary: AETNA SENIOR MEDICARE SUPP [facility] [place]  [address]zo PEDERSON  [facility] [address]  [facility] [address]  Phone: [phone]  Fax: [phone] Plan Frequency: 1-2x per week as needed. will start with 1x per week, if she needs more practice with HEP/manual intervention, will increase to 2x per week.    Plan of Care/Certification Expiration Date: 24        Plan of Care/Certification Expiration Date:  Plan of Care/Certification Expiration Date: 24    Frequency/Duration:   Plan Frequency: 1-2x per week as needed. will start with 1x per week, if she needs more practice with HEP/manual intervention, will increase to 2x per week.      Time In/Out:          PT Visit Info:         Visit Count:  4    OUTPATIENT PHYSICAL THERAPY:   Treatment Note 2024       Episode  (low back pain)               Treatment Diagnosis:    Sciatica, left side  Difficulty in walking, not elsewhere classified  Back stiffness  Stiffness of left knee, not elsewhere classified  Medical/Referring Diagnosis:    Left sided sciatica [M54.32]    Referring Physician:  Opal Abraham MD MD Orders:  PT Eval and Treat   Return MD Appt:  24   Date of Onset:  Onset Date: 24 (low back pain with anterolesthesis)     Allergies:   Atorvastatin and Hydrocodone-acetaminophen  Restrictions/Precautions:   Type 2 Diabetes      Interventions Planned (Treatment may consist of any combination of the following):     See Assessment Note  Subjective Comments:   Pt reported the nustep helped her left knee pain.   Initial Pain Level::      2/10 for back, 4/10 for L knee  Post Session Pain Level:        0/10 for back; 1/10 for L knee  Medications Last Reviewed:  2024  Updated Objective Findings:   Pt. Demonstrated improved gait and posture after session.   Treatment   THERAPEUTIC EXERCISE: (40  minutes):    Exercises per grid

## 2024-06-27 ENCOUNTER — HOSPITAL ENCOUNTER (OUTPATIENT)
Dept: PHYSICAL THERAPY | Age: 84
Setting detail: RECURRING SERIES
Discharge: HOME | End: 2024-06-30
Payer: MEDICARE

## 2024-06-27 PROCEDURE — 97110 THERAPEUTIC EXERCISES: CPT

## 2024-06-27 PROCEDURE — 97140 MANUAL THERAPY 1/> REGIONS: CPT

## 2024-06-27 NOTE — PROGRESS NOTES
Nguyen Gibson  : 1940  Primary: Medicare Part A And B (Medicare)  Secondary: AETNA SENIOR MEDICARE SUPP [facility] [place]  [address]ERSON  [address]  [facility] [address]  Phone: [phone]  Fax: [phone] Plan Frequency: 1-2x per week as needed. will start with 1x per week, if she needs more practice with HEP/manual intervention, will increase to 2x per week.    Plan of Care/Certification Expiration Date: 24        Plan of Care/Certification Expiration Date:  Plan of Care/Certification Expiration Date: 24    Frequency/Duration:   Plan Frequency: 1-2x per week as needed. will start with 1x per week, if she needs more practice with HEP/manual intervention, will increase to 2x per week.      Time In/Out:          PT Visit Info:         Visit Count:  4    OUTPATIENT PHYSICAL THERAPY:   Treatment Note 2024       Episode  (low back pain)               Treatment Diagnosis:    Sciatica, left side  Difficulty in walking, not elsewhere classified  Back stiffness  Stiffness of left knee, not elsewhere classified  Medical/Referring Diagnosis:    Left sided sciatica [M54.32]    Referring Physician:  Opal Abraham MD MD Orders:  PT Eval and Treat   Return MD Appt:  24   Date of Onset:  Onset Date: 24 (low back pain with anterolesthesis)     Allergies:   Atorvastatin and Hydrocodone-acetaminophen  Restrictions/Precautions:   Type 2 Diabetes      Interventions Planned (Treatment may consist of any combination of the following):     See Assessment Note  Subjective Comments:   Pt reported the manual and the nustep help her knee the most.   Initial Pain Level::      0/10 for back, 2/10 for L knee  Post Session Pain Level:        0/10 for back; 3/10 for L knee  Medications Last Reviewed:  2024  Updated Objective Findings:   Pt. Demonstrated improved gait and posture after session.   Treatment   THERAPEUTIC EXERCISE: (40  minutes):    Exercises 
siup at 39wks with srom

## 2024-07-02 ENCOUNTER — APPOINTMENT (OUTPATIENT)
Dept: PHYSICAL THERAPY | Age: 84
End: 2024-07-02
Payer: MEDICARE

## 2024-07-02 ENCOUNTER — HOSPITAL ENCOUNTER (OUTPATIENT)
Dept: PHYSICAL THERAPY | Age: 84
Setting detail: RECURRING SERIES
Discharge: HOME | End: 2024-07-05
Payer: MEDICARE

## 2024-07-02 PROCEDURE — 97110 THERAPEUTIC EXERCISES: CPT

## 2024-07-02 PROCEDURE — 97140 MANUAL THERAPY 1/> REGIONS: CPT

## 2024-07-02 NOTE — PROGRESS NOTES
Jostin Gibson  : 1940  Primary: Medicare Part A And B (Medicare)  Secondary: AETNA SENIOR MEDICARE SUPP [facility] [place]  [address]  [address]  [facility] [address]  Phone: [phone]  Fax: [phone] Plan Frequency: 1-2x per week as needed. will start with 1x per week, if she needs more practice with HEP/manual intervention, will increase to 2x per week.    Plan of Care/Certification Expiration Date: 24        Plan of Care/Certification Expiration Date:  Plan of Care/Certification Expiration Date: 24    Frequency/Duration:   Plan Frequency: 1-2x per week as needed. will start with 1x per week, if she needs more practice with HEP/manual intervention, will increase to 2x per week.      Time In/Out:          PT Visit Info:         Visit Count:  8     OUTPATIENT PHYSICAL THERAPY:   Treatment Note 2024       Episode  (low back pain)               Treatment Diagnosis:    Sciatica, left side  Difficulty in walking, not elsewhere classified  Back stiffness  Stiffness of left knee, not elsewhere classified  Medical/Referring Diagnosis:    Left sided sciatica [M54.32]    Referring Physician:  Opal Abraham MD MD Orders:  PT Eval and Treat   Return MD Appt:  24   Date of Onset:  Onset Date: 24 (low back pain with anterolesthesis)     Allergies:   Atorvastatin and Hydrocodone-acetaminophen  Restrictions/Precautions:   Type 2 Diabetes      Interventions Planned (Treatment may consist of any combination of the following):     See Assessment Note  Subjective Comments:   Pt reported the manual and the nustep help her knee the most.   Initial Pain Level::      2/10 for back, 2/10 for L knee  Post Session Pain Level:        1/10 for back; 1/10 for L knee  Medications Last Reviewed:  2024  Updated Objective Findings:   see progress report  Treatment   THERAPEUTIC EXERCISE: (45 minutes):    Exercises per grid below to improve mobility, 
Pain Questionnaire  Score:  Initial: 37/50  Most Recent: 17/50 (Date: 7/2/24 )   Interpretation of Score: Each section is scored on a 0-5 scale, 5 representing the greatest disability.  The scores of each section are added together for a total score of 50.      Medical Necessity:   > Patient is expected to demonstrate progress in strength and range of motion to increase independence with walking and standing.  Reason For Services/Other Comments:  > Patient continues to require skilled intervention due to pain levels with walking, need for guidance with therex and manual for joint dysfunction.      Regarding Nguyen Gibson's therapy, I certify that the treatment plan above will be carried out by a therapist or under their direction.  Thank you for this referral,  Sade Bell, PT     Referring Physician Signature: Opal Abraham* No Signature is Required for this note.        Charge Capture  Events  Appt Desk  Attendance Report

## 2024-07-05 ENCOUNTER — APPOINTMENT (OUTPATIENT)
Dept: PHYSICAL THERAPY | Age: 84
End: 2024-07-05
Payer: MEDICARE

## 2024-07-09 ENCOUNTER — HOSPITAL ENCOUNTER (OUTPATIENT)
Dept: PHYSICAL THERAPY | Age: 84
Setting detail: RECURRING SERIES
Discharge: HOME | End: 2024-07-12
Payer: MEDICARE

## 2024-07-09 PROCEDURE — 97110 THERAPEUTIC EXERCISES: CPT

## 2024-07-09 PROCEDURE — 97140 MANUAL THERAPY 1/> REGIONS: CPT

## 2024-07-09 NOTE — PROGRESS NOTES
Jostin Gibson  : 1940  Primary: Medicare Part A And B (Medicare)  Secondary: AETNA SENIOR MEDICARE SUPP [facility] [place]  [address] P[address]ERSON  [address]  [facility] [address]  Phone: [phone]  Fax: [phone] Plan Frequency: 1-2x per week as needed. will start with 1x per week, if she needs more practice with HEP/manual intervention, will increase to 2x per week.    Plan of Care/Certification Expiration Date: 24        Plan of Care/Certification Expiration Date:  Plan of Care/Certification Expiration Date: 24    Frequency/Duration:   Plan Frequency: 1-2x per week as needed. will start with 1x per week, if she needs more practice with HEP/manual intervention, will increase to 2x per week.      Time In/Out:          PT Visit Info:         Visit Count:  9     OUTPATIENT PHYSICAL THERAPY:   Treatment Note 2024       Episode  (low back pain)               Treatment Diagnosis:    Sciatica, left side  Difficulty in walking, not elsewhere classified  Back stiffness  Stiffness of left knee, not elsewhere classified  Medical/Referring Diagnosis:    Left sided sciatica [M54.32]    Referring Physician:  Opal Abraham MD MD Orders:  PT Eval and Treat   Return MD Appt:  24   Date of Onset:  Onset Date: 24 (low back pain with anterolesthesis)     Allergies:   Atorvastatin and Hydrocodone-acetaminophen  Restrictions/Precautions:   Type 2 Diabetes      Interventions Planned (Treatment may consist of any combination of the following):     See Assessment Note  Subjective Comments:   Pt reports she has not been having any back pain.   Initial Pain Level::      0/10 for back, 2/10 for L knee  Post Session Pain Level:        0/10 for back; 1/10 for L knee  Medications Last Reviewed 2024    Updated Objective Findings:   tender to L patella tendon  Treatment   THERAPEUTIC EXERCISE: (45 minutes):    Exercises per grid below to improve mobility,

## 2024-07-11 ENCOUNTER — HOSPITAL ENCOUNTER (OUTPATIENT)
Dept: PHYSICAL THERAPY | Age: 84
Setting detail: RECURRING SERIES
Discharge: HOME | End: 2024-07-14
Payer: MEDICARE

## 2024-07-11 PROCEDURE — 97140 MANUAL THERAPY 1/> REGIONS: CPT

## 2024-07-11 PROCEDURE — 97110 THERAPEUTIC EXERCISES: CPT

## 2024-07-11 NOTE — PROGRESS NOTES
Jostin Gibson  : 1940  Primary: Medicare Part A And B (Medicare)  Secondary: AETNA SENIOR MEDICARE SUPP [facility] [place]  [address] P[address]ERSON  [address]  [facility] [address]  Phone: [phone]  Fax: [phone] Plan Frequency: 1-2x per week as needed. will start with 1x per week, if she needs more practice with HEP/manual intervention, will increase to 2x per week.    Plan of Care/Certification Expiration Date: 24        Plan of Care/Certification Expiration Date:  Plan of Care/Certification Expiration Date: 24    Frequency/Duration:   Plan Frequency: 1-2x per week as needed. will start with 1x per week, if she needs more practice with HEP/manual intervention, will increase to 2x per week.      Time In/Out:          PT Visit Info:         Visit Count:  10     OUTPATIENT PHYSICAL THERAPY:   Treatment Note 2024       Episode  (low back pain)               Treatment Diagnosis:    Sciatica, left side  Difficulty in walking, not elsewhere classified  Back stiffness  Stiffness of left knee, not elsewhere classified  Medical/Referring Diagnosis:    Left sided sciatica [M54.32]    Referring Physician:  Opal Abraham MD MD Orders:  PT Eval and Treat   Return MD Appt:  24   Date of Onset:  Onset Date: 24 (low back pain with anterolesthesis)     Allergies:   Atorvastatin and Hydrocodone-acetaminophen  Restrictions/Precautions:   Type 2 Diabetes      Interventions Planned (Treatment may consist of any combination of the following):     See Assessment Note  Subjective Comments:   Pt reported having increased soreness after last session and stated that was the first time she had the plunger to her knee.   Initial Pain Level::      0/10 for back, 3/10 for L knee  Post Session Pain Level:     0/10 for back & 1/10 for left knee     Medications Last Reviewed 2024    Updated Objective Findings:   Pt. Entered clinic with moderate antalgic gait and

## 2024-07-15 DIAGNOSIS — I10 ESSENTIAL HYPERTENSION WITH GOAL BLOOD PRESSURE LESS THAN 140/90: ICD-10-CM

## 2024-07-15 RX ORDER — CARVEDILOL 3.12 MG/1
3.12 TABLET ORAL 2 TIMES DAILY
Qty: 180 TABLET | Refills: 3 | OUTPATIENT
Start: 2024-07-15

## 2024-07-16 ENCOUNTER — HOSPITAL ENCOUNTER (OUTPATIENT)
Dept: PHYSICAL THERAPY | Age: 84
Setting detail: RECURRING SERIES
Discharge: HOME | End: 2024-07-19
Payer: MEDICARE

## 2024-07-16 PROCEDURE — 97140 MANUAL THERAPY 1/> REGIONS: CPT

## 2024-07-16 PROCEDURE — 97110 THERAPEUTIC EXERCISES: CPT

## 2024-07-16 ASSESSMENT — PAIN SCALES - GENERAL: PAINLEVEL_OUTOF10: 2

## 2024-07-16 NOTE — PROGRESS NOTES
Jostin Gibson  : 1940  Primary: Medicare Part A And B (Medicare)  Secondary: AETNA SENIOR MEDICARE SUPP [facility] @ [place]  Enzo PEDERSON  [facility] [address]  [facility] [address]  Phone: [phone]  Fax: [phone] Plan Frequency: 1-2x per week as needed. will start with 1x per week, if she needs more practice with HEP/manual intervention, will increase to 2x per week.    Plan of Care/Certification Expiration Date: 24        Plan of Care/Certification Expiration Date:  Plan of Care/Certification Expiration Date: 24    Frequency/Duration:   Plan Frequency: 1-2x per week as needed. will start with 1x per week, if she needs more practice with HEP/manual intervention, will increase to 2x per week.      Time In/Out:          PT Visit Info:         Visit Count:  11     OUTPATIENT PHYSICAL THERAPY:   Treatment Note 2024       Episode  (low back pain)               Treatment Diagnosis:    Sciatica, left side  Difficulty in walking, not elsewhere classified  Back stiffness  Stiffness of left knee, not elsewhere classified  Medical/Referring Diagnosis:    Left sided sciatica [M54.32]    Referring Physician:  Opal Abraham MD MD Orders:  PT Eval and Treat   Return MD Appt:  24   Date of Onset:  Onset Date: 24 (low back pain with anterolesthesis)     Allergies:   Atorvastatin and Hydrocodone-acetaminophen  Restrictions/Precautions:   Type 2 Diabetes      Interventions Planned (Treatment may consist of any combination of the following):     See Assessment Note  Subjective Comments:   Pt reported no back pain left knee 1/10  Initial Pain Level::   back no pain, left knee 1/10  Post Session Pain Level:  no pain in left knee or back after session    Medications Last Reviewed 2024    Updated Objective Findings:   Pt. Entered clinic with moderate antalgic gait and limping on left knee.   Treatment   THERAPEUTIC EXERCISE: (45 minutes):    Exercises per

## 2024-07-18 ENCOUNTER — HOSPITAL ENCOUNTER (OUTPATIENT)
Dept: PHYSICAL THERAPY | Age: 84
Setting detail: RECURRING SERIES
Discharge: HOME | End: 2024-07-21
Payer: MEDICARE

## 2024-07-18 PROCEDURE — 97140 MANUAL THERAPY 1/> REGIONS: CPT

## 2024-07-18 PROCEDURE — 97110 THERAPEUTIC EXERCISES: CPT

## 2024-07-18 NOTE — PROGRESS NOTES
MD MACKENZIE LONDONL [facility]   11/20/2024 11:00 AM Jos Tse MD [facility] AMB

## 2024-07-23 ENCOUNTER — HOSPITAL ENCOUNTER (OUTPATIENT)
Dept: PHYSICAL THERAPY | Age: 84
Setting detail: RECURRING SERIES
Discharge: HOME | End: 2024-07-26
Payer: MEDICARE

## 2024-07-23 PROCEDURE — 97110 THERAPEUTIC EXERCISES: CPT

## 2024-07-23 PROCEDURE — 97140 MANUAL THERAPY 1/> REGIONS: CPT

## 2024-07-23 NOTE — PROGRESS NOTES
Jostin Gibson  : 1940  Primary: Medicare Part A And B (Medicare)  Secondary: AETNA SENIOR MEDICARE SUPP [facility] [place]  [address]zo P[address]ERSON  [facility] [address]  [facility] [address]  Phone: [phone]  Fax: [phone] Plan Frequency: 1-2x per week as needed. will start with 1x per week, if she needs more practice with HEP/manual intervention, will increase to 2x per week.    Plan of Care/Certification Expiration Date: 24        Plan of Care/Certification Expiration Date:  Plan of Care/Certification Expiration Date: 24    Frequency/Duration:   Plan Frequency: 1-2x per week as needed. will start with 1x per week, if she needs more practice with HEP/manual intervention, will increase to 2x per week.      Time In/Out:          PT Visit Info:         Visit Count:  13     OUTPATIENT PHYSICAL THERAPY:   Treatment Note 2024       Episode  (low back pain)               Treatment Diagnosis:    Sciatica, left side  Difficulty in walking, not elsewhere classified  Back stiffness  Stiffness of left knee, not elsewhere classified  Medical/Referring Diagnosis:    Left sided sciatica [M54.32]    Referring Physician:  Opal Abraham MD MD Orders:  PT Eval and Treat   Return MD Appt:  24   Date of Onset:  Onset Date: 24 (low back pain with anterolesthesis)     Allergies:   Atorvastatin and Hydrocodone-acetaminophen  Restrictions/Precautions:   Type 2 Diabetes      Interventions Planned (Treatment may consist of any combination of the following):     See Assessment Note  Subjective Comments:   Pt continues to report no back pain and minimal left knee pain  Initial Pain Level::   back no pain, left knee 1/10  Post Session Pain Level:  no pain in left knee or back after session    Medications Last Reviewed 2024    Updated Objective Findings:   Pt walks with improved upright posture as compared to initial visit as well as improved use of LE through out gait

## 2024-07-25 ENCOUNTER — HOSPITAL ENCOUNTER (OUTPATIENT)
Dept: PHYSICAL THERAPY | Age: 84
Setting detail: RECURRING SERIES
Discharge: HOME | End: 2024-07-28
Payer: MEDICARE

## 2024-07-25 PROCEDURE — 97110 THERAPEUTIC EXERCISES: CPT

## 2024-07-25 PROCEDURE — 97140 MANUAL THERAPY 1/> REGIONS: CPT

## 2024-07-25 NOTE — THERAPY DISCHARGE
Nguyen Gibson  : 1940  Primary: Medicare Part A And B (Medicare)  Secondary: AETNA SENIOR MEDICARE SUPP [facility] [place]  [address] P[address]ERSON  [address]  [facility] [address]  Phone: [phone]  Fax: [phone] Plan Frequency: 1-2x per week as needed. will start with 1x per week, if she needs more practice with HEP/manual intervention, will increase to 2x per week.    Plan of Care/Certification Expiration Date: 24        Plan of Care/Certification Expiration Date:  Plan of Care/Certification Expiration Date: 24    Frequency/Duration: Plan Frequency: 1-2x per week as needed. will start with 1x per week, if she needs more practice with HEP/manual intervention, will increase to 2x per week.      Time In/Out:          PT Visit Info:    Progress Note Counter: 5      Visit Count:  14                OUTPATIENT PHYSICAL THERAPY:             Discharge Summary 2024               Episode (low back pain)         Treatment Diagnosis:     Sciatica, left side  Difficulty in walking, not elsewhere classified  Back stiffness  Stiffness of left knee, not elsewhere classified  Medical/Referring Diagnosis:    Left sided sciatica [M54.32]    Referring Physician:  Opal Abraham MD MD Orders:  PT Eval and Treat   Return MD Appt:  24  Date of Onset:  Onset Date: 24 (low back pain with anterolesthesis)     Allergies:  Atorvastatin and Hydrocodone-acetaminophen  Restrictions/Precautions:    Type 2 Diabetes      Medications Last Reviewed:  2024     SUBJECTIVE   History of Injury/Illness (Reason for Referral):  Pt with chronic low level low back pain that starting increasing about 3 months ago. C/o sharp pain in her tailbone but not down leg.  Standing and walking makes the pain worse and heating her back helps her pain. Pain is described as a pressure/tightness. Her pain also affects her sleep and ability to keep up with her home, dress, general ADLs.

## 2024-07-25 NOTE — PROGRESS NOTES
Jostin Gibson  : 1940  Primary: Medicare Part A And B (Medicare)  Secondary: AETNA SENIOR MEDICARE SUPP [facility] [place]  [address]zo PEDERSON  [facility] [address]  [facility] [address]  Phone: [phone]  Fax: [phone] Plan Frequency: 1-2x per week as needed. will start with 1x per week, if she needs more practice with HEP/manual intervention, will increase to 2x per week.    Plan of Care/Certification Expiration Date: 24        Plan of Care/Certification Expiration Date:  Plan of Care/Certification Expiration Date: 24    Frequency/Duration:   Plan Frequency: 1-2x per week as needed. will start with 1x per week, if she needs more practice with HEP/manual intervention, will increase to 2x per week.      Time In/Out:          PT Visit Info:         Visit Count:  14     OUTPATIENT PHYSICAL THERAPY:   Treatment Note 2024       Episode  (low back pain)               Treatment Diagnosis:    Sciatica, left side  Difficulty in walking, not elsewhere classified  Back stiffness  Stiffness of left knee, not elsewhere classified  Medical/Referring Diagnosis:    Left sided sciatica [M54.32]    Referring Physician:  Opal Abraham MD MD Orders:  PT Eval and Treat   Return MD Appt:  24   Date of Onset:  Onset Date: 24 (low back pain with anterolesthesis)     Allergies:   Atorvastatin and Hydrocodone-acetaminophen  Restrictions/Precautions:   Type 2 Diabetes      Interventions Planned (Treatment may consist of any combination of the following):     See Assessment Note  Subjective Comments:   Pt continues to report no back pain and minimal left knee pain  Initial Pain Level::   back no pain, left knee 1/10  Post Session Pain Level:  no pain in left knee or back after session    Medications Last Reviewed 2024    Updated Objective Findings:  see discharge summary  Treatment   THERAPEUTIC EXERCISE: (45 minutes):    Exercises per grid below to improve

## 2024-07-25 NOTE — PROGRESS NOTES
Nguyen Gibson  : 1940  Primary: Medicare Part A And B (Medicare)  Secondary: AETNA SENIOR MEDICARE SUPP [facility] [place]  [address] P[address]ERSON  [address]  [facility] [address]  Phone: [phone]  Fax: [phone] Plan Frequency: 1-2x per week as needed. will start with 1x per week, if she needs more practice with HEP/manual intervention, will increase to 2x per week.    Plan of Care/Certification Expiration Date: 24        Plan of Care/Certification Expiration Date:  Plan of Care/Certification Expiration Date: 24    Frequency/Duration: Plan Frequency: 1-2x per week as needed. will start with 1x per week, if she needs more practice with HEP/manual intervention, will increase to 2x per week.      Time In/Out:          PT Visit Info:    Progress Note Counter: 5      Visit Count:  14                OUTPATIENT PHYSICAL THERAPY:             Discharge Summary 2024               Episode (low back pain)         Treatment Diagnosis:     Sciatica, left side  Difficulty in walking, not elsewhere classified  Back stiffness  Stiffness of left knee, not elsewhere classified  Medical/Referring Diagnosis:    Left sided sciatica [M54.32]    Referring Physician:  Opal Abraham MD MD Orders:  PT Eval and Treat   Return MD Appt:  24  Date of Onset:  Onset Date: 24 (low back pain with anterolesthesis)     Allergies:  Atorvastatin and Hydrocodone-acetaminophen  Restrictions/Precautions:    Type 2 Diabetes      Medications Last Reviewed:  2024     SUBJECTIVE   History of Injury/Illness (Reason for Referral):  Pt with chronic low level low back pain that starting increasing about 3 months ago. C/o sharp pain in her tailbone but not down leg.  Standing and walking makes the pain worse and heating her back helps her pain. Pain is described as a pressure/tightness. Her pain also affects her sleep and ability to keep up with her home, dress, general ADLs.

## 2024-07-31 DIAGNOSIS — I10 ESSENTIAL HYPERTENSION WITH GOAL BLOOD PRESSURE LESS THAN 140/90: ICD-10-CM

## 2024-07-31 DIAGNOSIS — N18.4 TYPE 2 DIABETES MELLITUS WITH STAGE 4 CHRONIC KIDNEY DISEASE, WITHOUT LONG-TERM CURRENT USE OF INSULIN (HCC): ICD-10-CM

## 2024-07-31 DIAGNOSIS — E04.2 MULTIPLE THYROID NODULES: ICD-10-CM

## 2024-07-31 DIAGNOSIS — E78.00 HYPERCHOLESTEROLEMIA: ICD-10-CM

## 2024-07-31 DIAGNOSIS — E05.90 HYPERTHYROIDISM: ICD-10-CM

## 2024-07-31 DIAGNOSIS — I10 ESSENTIAL HYPERTENSION WITH GOAL BLOOD PRESSURE LESS THAN 140/90: Primary | ICD-10-CM

## 2024-07-31 DIAGNOSIS — E11.22 TYPE 2 DIABETES MELLITUS WITH STAGE 4 CHRONIC KIDNEY DISEASE, WITHOUT LONG-TERM CURRENT USE OF INSULIN (HCC): ICD-10-CM

## 2024-07-31 LAB
ALBUMIN SERPL-MCNC: 3.4 G/DL (ref 3.2–4.6)
ALBUMIN/GLOB SERPL: 0.9 (ref 1–1.9)
ALP SERPL-CCNC: 69 U/L (ref 35–104)
ALT SERPL-CCNC: 21 U/L (ref 12–65)
ANION GAP SERPL CALC-SCNC: 14 MMOL/L (ref 9–18)
AST SERPL-CCNC: 29 U/L (ref 15–37)
BASOPHILS # BLD: 0.1 K/UL (ref 0–0.2)
BASOPHILS NFR BLD: 1 % (ref 0–2)
BILIRUB SERPL-MCNC: <0.2 MG/DL (ref 0–1.2)
BUN SERPL-MCNC: 36 MG/DL (ref 8–23)
CALCIUM SERPL-MCNC: 9.5 MG/DL (ref 8.8–10.2)
CHLORIDE SERPL-SCNC: 105 MMOL/L (ref 98–107)
CHOLEST SERPL-MCNC: 145 MG/DL (ref 0–200)
CO2 SERPL-SCNC: 20 MMOL/L (ref 20–28)
CREAT SERPL-MCNC: 1.99 MG/DL (ref 0.6–1.1)
DIFFERENTIAL METHOD BLD: ABNORMAL
EOSINOPHIL # BLD: 0.2 K/UL (ref 0–0.8)
EOSINOPHIL NFR BLD: 3 % (ref 0.5–7.8)
ERYTHROCYTE [DISTWIDTH] IN BLOOD BY AUTOMATED COUNT: 14.6 % (ref 11.9–14.6)
EST. AVERAGE GLUCOSE BLD GHB EST-MCNC: 164 MG/DL
GLOBULIN SER CALC-MCNC: 3.8 G/DL (ref 2.3–3.5)
GLUCOSE SERPL-MCNC: 105 MG/DL (ref 70–99)
HBA1C MFR BLD: 7.3 % (ref 0–5.6)
HCT VFR BLD AUTO: 28.1 % (ref 35.8–46.3)
HDLC SERPL-MCNC: 41 MG/DL (ref 40–60)
HDLC SERPL: 3.6 (ref 0–5)
HGB BLD-MCNC: 8.9 G/DL (ref 11.7–15.4)
IMM GRANULOCYTES # BLD AUTO: 0 K/UL (ref 0–0.5)
IMM GRANULOCYTES NFR BLD AUTO: 0 % (ref 0–5)
LDLC SERPL CALC-MCNC: 83 MG/DL (ref 0–100)
LYMPHOCYTES # BLD: 1.4 K/UL (ref 0.5–4.6)
LYMPHOCYTES NFR BLD: 24 % (ref 13–44)
MCH RBC QN AUTO: 27.8 PG (ref 26.1–32.9)
MCHC RBC AUTO-ENTMCNC: 31.7 G/DL (ref 31.4–35)
MCV RBC AUTO: 87.8 FL (ref 82–102)
MONOCYTES # BLD: 0.5 K/UL (ref 0.1–1.3)
MONOCYTES NFR BLD: 9 % (ref 4–12)
NEUTS SEG # BLD: 3.7 K/UL (ref 1.7–8.2)
NEUTS SEG NFR BLD: 63 % (ref 43–78)
NRBC # BLD: 0 K/UL (ref 0–0.2)
PLATELET # BLD AUTO: 252 K/UL (ref 150–450)
PMV BLD AUTO: 9.7 FL (ref 9.4–12.3)
POTASSIUM SERPL-SCNC: 4.9 MMOL/L (ref 3.5–5.1)
PROT SERPL-MCNC: 7.2 G/DL (ref 6.3–8.2)
RBC # BLD AUTO: 3.2 M/UL (ref 4.05–5.2)
SODIUM SERPL-SCNC: 138 MMOL/L (ref 136–145)
TRIGL SERPL-MCNC: 106 MG/DL (ref 0–150)
TSH, 3RD GENERATION: 2.87 UIU/ML (ref 0.27–4.2)
VLDLC SERPL CALC-MCNC: 21 MG/DL (ref 6–23)
WBC # BLD AUTO: 5.8 K/UL (ref 4.3–11.1)

## 2024-08-06 ENCOUNTER — OFFICE VISIT (OUTPATIENT)
Dept: PRIMARY CARE CLINIC | Facility: CLINIC | Age: 84
End: 2024-08-06
Payer: MEDICARE

## 2024-08-06 VITALS
HEIGHT: 63 IN | HEART RATE: 79 BPM | DIASTOLIC BLOOD PRESSURE: 69 MMHG | BODY MASS INDEX: 29.06 KG/M2 | SYSTOLIC BLOOD PRESSURE: 126 MMHG | TEMPERATURE: 97.8 F | OXYGEN SATURATION: 99 % | WEIGHT: 164 LBS

## 2024-08-06 DIAGNOSIS — N18.4 TYPE 2 DIABETES MELLITUS WITH STAGE 4 CHRONIC KIDNEY DISEASE, WITHOUT LONG-TERM CURRENT USE OF INSULIN (HCC): Primary | ICD-10-CM

## 2024-08-06 DIAGNOSIS — I10 ESSENTIAL HYPERTENSION WITH GOAL BLOOD PRESSURE LESS THAN 140/90: ICD-10-CM

## 2024-08-06 DIAGNOSIS — M17.0 PRIMARY OSTEOARTHRITIS OF BOTH KNEES: ICD-10-CM

## 2024-08-06 DIAGNOSIS — E05.90 HYPERTHYROIDISM: ICD-10-CM

## 2024-08-06 DIAGNOSIS — E11.22 TYPE 2 DIABETES MELLITUS WITH STAGE 4 CHRONIC KIDNEY DISEASE, WITHOUT LONG-TERM CURRENT USE OF INSULIN (HCC): Primary | ICD-10-CM

## 2024-08-06 DIAGNOSIS — E04.2 MULTIPLE THYROID NODULES: ICD-10-CM

## 2024-08-06 PROCEDURE — 1090F PRES/ABSN URINE INCON ASSESS: CPT | Performed by: FAMILY MEDICINE

## 2024-08-06 PROCEDURE — G8427 DOCREV CUR MEDS BY ELIG CLIN: HCPCS | Performed by: FAMILY MEDICINE

## 2024-08-06 PROCEDURE — G8417 CALC BMI ABV UP PARAM F/U: HCPCS | Performed by: FAMILY MEDICINE

## 2024-08-06 PROCEDURE — 99214 OFFICE O/P EST MOD 30 MIN: CPT | Performed by: FAMILY MEDICINE

## 2024-08-06 PROCEDURE — 3051F HG A1C>EQUAL 7.0%<8.0%: CPT | Performed by: FAMILY MEDICINE

## 2024-08-06 PROCEDURE — 1036F TOBACCO NON-USER: CPT | Performed by: FAMILY MEDICINE

## 2024-08-06 PROCEDURE — 1123F ACP DISCUSS/DSCN MKR DOCD: CPT | Performed by: FAMILY MEDICINE

## 2024-08-06 RX ORDER — LISINOPRIL AND HYDROCHLOROTHIAZIDE 20; 12.5 MG/1; MG/1
1 TABLET ORAL DAILY
Qty: 90 TABLET | Refills: 3 | Status: SHIPPED | OUTPATIENT
Start: 2024-08-06

## 2024-08-06 RX ORDER — CARVEDILOL 3.12 MG/1
3.12 TABLET ORAL 2 TIMES DAILY
Qty: 180 TABLET | Refills: 1 | Status: SHIPPED | OUTPATIENT
Start: 2024-08-06

## 2024-08-06 ASSESSMENT — PATIENT HEALTH QUESTIONNAIRE - PHQ9
1. LITTLE INTEREST OR PLEASURE IN DOING THINGS: NOT AT ALL
SUM OF ALL RESPONSES TO PHQ QUESTIONS 1-9: 0
2. FEELING DOWN, DEPRESSED OR HOPELESS: NOT AT ALL
SUM OF ALL RESPONSES TO PHQ9 QUESTIONS 1 & 2: 0
SUM OF ALL RESPONSES TO PHQ QUESTIONS 1-9: 0

## 2024-08-06 NOTE — PROGRESS NOTES
[facility] PHYSICIAN SERVICES  [facility] PRIMARY CARE  [address] [place] [address] DR FLORENCE WOODSON [address]  Dept: [phone]       Patient: Nguyen Gibson  YOB: 1940  Patient Age: 84 y.o.  Patient Sex: female  Medical Record: [id number]  Visit Date: 08/06/2024    Family Practice Clinic Note    Chief Complaint   Patient presents with    Hypertension     Patient is here to follow up to hypertension, she is requesting refills. She took her medication around 9 am.        Ms. Gibson reports that her A1C has improved from 8.1 to 7.3, and she feels better than she has in a while. She has been seeing a kidney doctor for the past year due to her kidney function and hormone production. The patient mentions that her kidney doctor has prescribed her an additional tablet and lisinopril for blood pressure management. She also reports experiencing muscle cramps in her knees and has been using [employer] products for relief. Ms. Gibson states that her leg has improved, and she can now bend it without significant pressure.    The patient has been seeing Dr. Tse and is scheduled for a follow-up visit on November 15th. She is currently taking Lisinopril once a day and Carvedilol twice a day, with her medications being managed through [facility]. Ms. Gibson's blood pressure readings have been stable, with a slightly low bottom number and a slightly high top number. She reports feeling tired but acknowledges that her readings have been consistent.    Ms. Johnson called the clinic complaining of kidney pain without hematuria. She has seen her primary care physician multiple times and has been prescribed antibiotics. The patient did not sleep well the previous night due to the pain and was advised to either contact her primary care physician or visit the local emergency room.         Allergies   Allergen Reactions    Atorvastatin Other (See Comments)     Leg pain    Hydrocodone-Acetaminophen Nausea Only and Rash

## 2024-10-09 ENCOUNTER — TELEPHONE (OUTPATIENT)
Dept: PRIMARY CARE CLINIC | Facility: CLINIC | Age: 84
End: 2024-10-09

## 2024-10-09 RX ORDER — ONDANSETRON 4 MG/1
4 TABLET, FILM COATED ORAL 2 TIMES DAILY PRN
Qty: 15 TABLET | Refills: 0 | Status: SHIPPED | OUTPATIENT
Start: 2024-10-09

## 2024-10-09 NOTE — TELEPHONE ENCOUNTER
Pt called stating she has been nauseous for the past few days and is wondering if something could be called in to the pharmacy or if she would have to have an appointment.

## 2024-10-21 ENCOUNTER — OFFICE VISIT (OUTPATIENT)
Dept: PRIMARY CARE CLINIC | Facility: CLINIC | Age: 84
End: 2024-10-21
Payer: MEDICARE

## 2024-10-21 VITALS
WEIGHT: 154 LBS | DIASTOLIC BLOOD PRESSURE: 49 MMHG | HEIGHT: 63 IN | BODY MASS INDEX: 27.29 KG/M2 | OXYGEN SATURATION: 100 % | TEMPERATURE: 97.3 F | SYSTOLIC BLOOD PRESSURE: 104 MMHG | HEART RATE: 83 BPM

## 2024-10-21 DIAGNOSIS — N39.0 URINARY TRACT INFECTION WITHOUT HEMATURIA, SITE UNSPECIFIED: Primary | ICD-10-CM

## 2024-10-21 DIAGNOSIS — R14.2 BURPING: ICD-10-CM

## 2024-10-21 DIAGNOSIS — N18.4 CKD (CHRONIC KIDNEY DISEASE) STAGE 4, GFR 15-29 ML/MIN (HCC): ICD-10-CM

## 2024-10-21 DIAGNOSIS — N39.0 URINARY TRACT INFECTION WITHOUT HEMATURIA, SITE UNSPECIFIED: ICD-10-CM

## 2024-10-21 DIAGNOSIS — N18.4 TYPE 2 DIABETES MELLITUS WITH STAGE 4 CHRONIC KIDNEY DISEASE, WITHOUT LONG-TERM CURRENT USE OF INSULIN (HCC): ICD-10-CM

## 2024-10-21 DIAGNOSIS — E11.22 TYPE 2 DIABETES MELLITUS WITH STAGE 4 CHRONIC KIDNEY DISEASE, WITHOUT LONG-TERM CURRENT USE OF INSULIN (HCC): ICD-10-CM

## 2024-10-21 DIAGNOSIS — R10.9 ABDOMINAL CRAMPING: ICD-10-CM

## 2024-10-21 LAB
BILIRUBIN, URINE, POC: NEGATIVE
BLOOD URINE, POC: NEGATIVE
GLUCOSE URINE, POC: 1000
KETONES, URINE, POC: NEGATIVE
LEUKOCYTE ESTERASE, URINE, POC: ABNORMAL
NITRITE, URINE, POC: NEGATIVE
PH, URINE, POC: 5.5 (ref 4.6–8)
PROTEIN,URINE, POC: 30
SPECIFIC GRAVITY, URINE, POC: 1.01 (ref 1–1.03)
URINALYSIS CLARITY, POC: CLEAR
URINALYSIS COLOR, POC: YELLOW
UROBILINOGEN, POC: ABNORMAL

## 2024-10-21 PROCEDURE — 1036F TOBACCO NON-USER: CPT | Performed by: FAMILY MEDICINE

## 2024-10-21 PROCEDURE — 81003 URINALYSIS AUTO W/O SCOPE: CPT | Performed by: FAMILY MEDICINE

## 2024-10-21 PROCEDURE — 99214 OFFICE O/P EST MOD 30 MIN: CPT | Performed by: FAMILY MEDICINE

## 2024-10-21 PROCEDURE — 3051F HG A1C>EQUAL 7.0%<8.0%: CPT | Performed by: FAMILY MEDICINE

## 2024-10-21 PROCEDURE — 1090F PRES/ABSN URINE INCON ASSESS: CPT | Performed by: FAMILY MEDICINE

## 2024-10-21 PROCEDURE — G8427 DOCREV CUR MEDS BY ELIG CLIN: HCPCS | Performed by: FAMILY MEDICINE

## 2024-10-21 PROCEDURE — G8484 FLU IMMUNIZE NO ADMIN: HCPCS | Performed by: FAMILY MEDICINE

## 2024-10-21 PROCEDURE — 1123F ACP DISCUSS/DSCN MKR DOCD: CPT | Performed by: FAMILY MEDICINE

## 2024-10-21 PROCEDURE — G8417 CALC BMI ABV UP PARAM F/U: HCPCS | Performed by: FAMILY MEDICINE

## 2024-10-21 RX ORDER — INSULIN GLARGINE 100 [IU]/ML
10 INJECTION, SOLUTION SUBCUTANEOUS NIGHTLY
Qty: 5 ADJUSTABLE DOSE PRE-FILLED PEN SYRINGE | Refills: 3 | Status: SHIPPED | OUTPATIENT
Start: 2024-10-21

## 2024-10-21 RX ORDER — BLOOD-GLUCOSE METER
KIT MISCELLANEOUS
Qty: 1 KIT | Refills: 0 | Status: SHIPPED | OUTPATIENT
Start: 2024-10-21 | End: 2024-10-21

## 2024-10-21 SDOH — ECONOMIC STABILITY: FOOD INSECURITY: WITHIN THE PAST 12 MONTHS, THE FOOD YOU BOUGHT JUST DIDN'T LAST AND YOU DIDN'T HAVE MONEY TO GET MORE.: NEVER TRUE

## 2024-10-21 SDOH — ECONOMIC STABILITY: INCOME INSECURITY: HOW HARD IS IT FOR YOU TO PAY FOR THE VERY BASICS LIKE FOOD, HOUSING, MEDICAL CARE, AND HEATING?: SOMEWHAT HARD

## 2024-10-21 SDOH — ECONOMIC STABILITY: FOOD INSECURITY: WITHIN THE PAST 12 MONTHS, YOU WORRIED THAT YOUR FOOD WOULD RUN OUT BEFORE YOU GOT MONEY TO BUY MORE.: NEVER TRUE

## 2024-10-21 ASSESSMENT — PATIENT HEALTH QUESTIONNAIRE - PHQ9
SUM OF ALL RESPONSES TO PHQ QUESTIONS 1-9: 0
2. FEELING DOWN, DEPRESSED OR HOPELESS: NOT AT ALL
SUM OF ALL RESPONSES TO PHQ QUESTIONS 1-9: 0
SUM OF ALL RESPONSES TO PHQ9 QUESTIONS 1 & 2: 0
1. LITTLE INTEREST OR PLEASURE IN DOING THINGS: NOT AT ALL

## 2024-10-21 NOTE — PATIENT INSTRUCTIONS
[employer]*  (Call [facility] if you need more resources.)    [employer] Financial Assistance  They offer: help uninsured patients who do not qualify for government-sponsored health insurance and cannot afford to pay for their medical care. Insured patients may also qualify depending on family income, family size, and medical needs.   Contact: [phone]   Helpful Info: How to apply-  Option 1: Fill out the Financial Assistance Application(FAP). Copies of the Financial Assistance Application and the FAP may be obtained for free by calling the [employer] service department at [phone].   Option 2: download a copy from the [employer] website: [email or web address]     [employer]    Eligibility Assistance: [phone]    [employer]  They offer: medication cost assistance, personal care items, and small durable medical equipment to low income and uninsured patients with chronic health conditions.   Contact: [phone] or [phone]     Wellness Outreach  They offer: connections to financial assistance for social and medical services for low income and uninsured persons.   Contact: [phone] (leave message with name and contact number if no answer).    Utility Assistance     [employer] Low Income Home [employer] Assistance Program  They offer: energy assistance.  Contact: 968.861.6374; [email or web address]/[email or web address]  Helpful Info: Must be a [place] resident and need financial assistance with home energy costs.    [employer]   They offer: wide range of services to low and moderate-income residents in [facility]  Contact: [phone]; [address] [place] [address]    [facility]   They offer: basic needs for stability and support services.   Contact: [phone]; [address] [place] [address]     Kyree Lopez [place]   They

## 2024-10-21 NOTE — PROGRESS NOTES
Nguyen Gibson (:  1940) is a 84 y.o. female,Established patient, here for evaluation of the following chief complaint(s):  Abdominal Pain (Patient presents with low bilateral abdominal pain and cramping x 2 weeks but worse today. She has beenn doing a lot of burping.), Back pain (Patient presents with low back pain x 2 weeks.), and Diabetes (Patient's blood sugar has been running in the in the mid 200's.)         Assessment & Plan  Urinary tract infection without hematuria, site unspecified    Urine dip shows trace leukocytes.  Will do urine culture.    Orders:    AMB POC URINALYSIS DIP STICK AUTO W/O MICRO    Culture, Urine; Future    Type 2 diabetes mellitus with stage 4 chronic kidney disease, without long-term current use of insulin ([facility])    Blood sugars uncontrolled.  Patient will start using her insulin.  Prescription rewritten.  Patient told to start at 5 units initially and go up to 10 if sugars are uncontrolled.    Orders:    insulin glargine (BASAGLAR KWIKPEN) 100 UNIT/ML injection pen; Inject 10 Units into the skin nightly    CKD (chronic kidney disease) stage 4, GFR 15-29 ml/min ([facility])    Family wanted to know when patient went into stage IV.  As per lab work went into stage IV in 2024.         Burping    Will try patient on PPI.  Patient denies any heartburn.    Orders:    omeprazole (PRILOSEC) 20 MG delayed release capsule; Take 1 capsule by mouth every morning (before breakfast)    Abdominal cramping    Will place patient on Levsin as needed.    Orders:    hyoscyamine (LEVSIN/SL) 0.125 MG sublingual tablet; Place 1 tablet under the tongue every 4 hours as needed for Cramping      Return in about 2 weeks (around 2024) for routine ffup.       Subjective   84-year-old female patient of Dr. Abraham comes in because of elevated blood sugar.  Patient with type 2 diabetes with CKD 4.  Patient seeing nephrologist.  Patient also diagnosed to have multiple myeloma.  Seeing oncologist.

## 2024-10-21 NOTE — ASSESSMENT & PLAN NOTE
Blood sugars uncontrolled.  Patient will start using her insulin.  Prescription rewritten.  Patient told to start at 5 units initially and go up to 10 if sugars are uncontrolled.    Orders:    insulin glargine (BASAGLAR KWIKPEN) 100 UNIT/ML injection pen; Inject 10 Units into the skin nightly

## 2024-10-21 NOTE — ASSESSMENT & PLAN NOTE
Family wanted to know when patient went into stage IV.  As per lab work went into stage IV in January 2024.

## 2024-10-23 LAB
BACTERIA SPEC CULT: NORMAL
SERVICE CMNT-IMP: NORMAL

## 2024-11-04 ENCOUNTER — OFFICE VISIT (OUTPATIENT)
Dept: PRIMARY CARE CLINIC | Facility: CLINIC | Age: 84
End: 2024-11-04

## 2024-11-04 VITALS
HEIGHT: 63 IN | OXYGEN SATURATION: 99 % | WEIGHT: 154 LBS | HEART RATE: 88 BPM | TEMPERATURE: 97.8 F | DIASTOLIC BLOOD PRESSURE: 50 MMHG | BODY MASS INDEX: 27.29 KG/M2 | SYSTOLIC BLOOD PRESSURE: 143 MMHG

## 2024-11-04 DIAGNOSIS — N18.4 TYPE 2 DIABETES MELLITUS WITH STAGE 4 CHRONIC KIDNEY DISEASE, WITHOUT LONG-TERM CURRENT USE OF INSULIN (HCC): Primary | ICD-10-CM

## 2024-11-04 DIAGNOSIS — C90.00 MULTIPLE MYELOMA, REMISSION STATUS UNSPECIFIED (HCC): ICD-10-CM

## 2024-11-04 DIAGNOSIS — D63.1 ANEMIA DUE TO STAGE 4 CHRONIC KIDNEY DISEASE (HCC): ICD-10-CM

## 2024-11-04 DIAGNOSIS — N18.4 CKD (CHRONIC KIDNEY DISEASE) STAGE 4, GFR 15-29 ML/MIN (HCC): ICD-10-CM

## 2024-11-04 DIAGNOSIS — N18.4 ANEMIA DUE TO STAGE 4 CHRONIC KIDNEY DISEASE (HCC): ICD-10-CM

## 2024-11-04 DIAGNOSIS — I10 ESSENTIAL HYPERTENSION WITH GOAL BLOOD PRESSURE LESS THAN 140/90: ICD-10-CM

## 2024-11-04 DIAGNOSIS — E11.22 TYPE 2 DIABETES MELLITUS WITH STAGE 4 CHRONIC KIDNEY DISEASE, WITHOUT LONG-TERM CURRENT USE OF INSULIN (HCC): Primary | ICD-10-CM

## 2024-11-04 ASSESSMENT — PATIENT HEALTH QUESTIONNAIRE - PHQ9
SUM OF ALL RESPONSES TO PHQ QUESTIONS 1-9: 0
SUM OF ALL RESPONSES TO PHQ9 QUESTIONS 1 & 2: 0
SUM OF ALL RESPONSES TO PHQ QUESTIONS 1-9: 0
1. LITTLE INTEREST OR PLEASURE IN DOING THINGS: NOT AT ALL
SUM OF ALL RESPONSES TO PHQ QUESTIONS 1-9: 0
SUM OF ALL RESPONSES TO PHQ QUESTIONS 1-9: 0
2. FEELING DOWN, DEPRESSED OR HOPELESS: NOT AT ALL

## 2024-11-04 NOTE — PATIENT INSTRUCTIONS
Dear Nguyen Gibson,    Thank you for visiting us on November 4, 2024. We appreciate your commitment to managing your health and are here to support you in improving your diabetes control.    Here are the key instructions from today's consultation:    - Insulin Management:    - Continue with Basaglar (long-acting insulin) once daily, increasing from 10 units.    - Use Fiasp (fast-acting insulin) before meals only as a rescue dose when blood sugar is over 200, using the sliding scale provided.    - Monitor morning blood sugar to adjust Basaglar dosage, aiming for a range of 100-150.    - Diet and Monitoring:    - Maintain a balanced diet without strict restrictions but avoid high-sugar foods like donuts.    - Continue using the continuous glucose monitor and verify readings with a finger stick device, especially at night or if readings seem off.    - No need for strict carbohydrate counting at this time.    - Medication Adjustments:    - Discontinue Jardiance and glipizide as advised in the hospital.    - Possible reintroduction of Jardiance pending nephrologist's advice to protect kidney function.    - Health Monitoring:    - Schedule an appointment with the nephrologist to discuss kidney health and potential treatments.    - Follow up with the oncologist regarding multiple myeloma and coordinate with the nephrologist before starting any new treatments.    - Monitor blood pressure at home regularly.    - Additional Support:    - Consider using an insulin pump for easier management if recommended by an endocrinologist.    - We will provide a sliding scale for insulin dosage adjustments and further educational materials to assist you.    - Practice self-administering insulin to become comfortable with the process.    Please ensure to keep all scheduled appointments and reach out if you have any concerns about your treatment plan. We will follow up in a couple of weeks to assess your progress.    Best regards,

## 2024-11-06 ENCOUNTER — TELEPHONE (OUTPATIENT)
Dept: PRIMARY CARE CLINIC | Facility: CLINIC | Age: 84
End: 2024-11-06

## 2024-11-06 DIAGNOSIS — E11.22 TYPE 2 DIABETES MELLITUS WITH STAGE 4 CHRONIC KIDNEY DISEASE, WITHOUT LONG-TERM CURRENT USE OF INSULIN (HCC): Primary | ICD-10-CM

## 2024-11-06 DIAGNOSIS — N18.4 TYPE 2 DIABETES MELLITUS WITH STAGE 4 CHRONIC KIDNEY DISEASE, WITHOUT LONG-TERM CURRENT USE OF INSULIN (HCC): Primary | ICD-10-CM

## 2024-11-06 RX ORDER — ACYCLOVIR 400 MG/1
TABLET ORAL
Qty: 2 EACH | Refills: 5 | Status: SHIPPED | OUTPATIENT
Start: 2024-11-06

## 2024-11-06 NOTE — TELEPHONE ENCOUNTER
Pts daughter (Steph) called stating [id number] needs diagnosis code for glucose monitor     Also would like to confirm, they can do short acting insulin during the day vs in the morning because it is spiking in the middle of the day.     Also would like to know if she should restart Jardiance or wait until 2 week follow up     [phone]

## 2024-11-13 ENCOUNTER — TELEPHONE (OUTPATIENT)
Dept: PRIMARY CARE CLINIC | Facility: CLINIC | Age: 84
End: 2024-11-13

## 2024-11-13 RX ORDER — HYDRALAZINE HYDROCHLORIDE 10 MG/1
10 TABLET, FILM COATED ORAL 3 TIMES DAILY
Qty: 90 TABLET | Refills: 2 | Status: SHIPPED | OUTPATIENT
Start: 2024-11-13 | End: 2025-11-13

## 2024-11-13 NOTE — TELEPHONE ENCOUNTER
Patient was suppose to get an Iron infusion today but her blood pressure was too high so they would not do the infusion. She is requesting something to bring her blood pressure down.  Per Dr. Abraham send in Hydralazine 25mg tid. She is aware to continue taking the Amlodipine and Carvedilol. She is to check her blood pressure and let us know if it is not going down.

## 2024-11-13 NOTE — TELEPHONE ENCOUNTER
Requesting a call about her blood pressure. Pt is requesting she gets a call back before the end of the day.

## 2024-11-20 ENCOUNTER — OFFICE VISIT (OUTPATIENT)
Dept: ENDOCRINOLOGY | Age: 84
End: 2024-11-20
Payer: MEDICARE

## 2024-11-20 VITALS
RESPIRATION RATE: 16 BRPM | HEART RATE: 66 BPM | BODY MASS INDEX: 26.86 KG/M2 | TEMPERATURE: 97 F | WEIGHT: 151.6 LBS | SYSTOLIC BLOOD PRESSURE: 150 MMHG | HEIGHT: 63 IN | DIASTOLIC BLOOD PRESSURE: 60 MMHG | OXYGEN SATURATION: 99 %

## 2024-11-20 DIAGNOSIS — E04.2 MULTIPLE THYROID NODULES: ICD-10-CM

## 2024-11-20 DIAGNOSIS — E05.90 HYPERTHYROIDISM: Primary | ICD-10-CM

## 2024-11-20 DIAGNOSIS — E05.90 HYPERTHYROIDISM: ICD-10-CM

## 2024-11-20 LAB
ALBUMIN SERPL-MCNC: 3.7 G/DL (ref 3.2–4.6)
ALBUMIN/GLOB SERPL: 0.8 (ref 1–1.9)
ALP SERPL-CCNC: 76 U/L (ref 35–104)
ALT SERPL-CCNC: 25 U/L (ref 8–45)
AST SERPL-CCNC: 29 U/L (ref 15–37)
BILIRUB DIRECT SERPL-MCNC: <0.2 MG/DL (ref 0–0.4)
BILIRUB SERPL-MCNC: 0.2 MG/DL (ref 0–1.2)
GLOBULIN SER CALC-MCNC: 4.4 G/DL (ref 2.3–3.5)
PROT SERPL-MCNC: 8.1 G/DL (ref 6.3–8.2)
T3 SERPL-MCNC: 1.04 NG/ML (ref 0.6–1.81)
T4 FREE SERPL-MCNC: 1.1 NG/DL (ref 0.9–1.7)
TSH, 3RD GENERATION: 1.59 UIU/ML (ref 0.27–4.2)

## 2024-11-20 PROCEDURE — G8417 CALC BMI ABV UP PARAM F/U: HCPCS | Performed by: INTERNAL MEDICINE

## 2024-11-20 PROCEDURE — G8427 DOCREV CUR MEDS BY ELIG CLIN: HCPCS | Performed by: INTERNAL MEDICINE

## 2024-11-20 PROCEDURE — 1126F AMNT PAIN NOTED NONE PRSNT: CPT | Performed by: INTERNAL MEDICINE

## 2024-11-20 PROCEDURE — 1123F ACP DISCUSS/DSCN MKR DOCD: CPT | Performed by: INTERNAL MEDICINE

## 2024-11-20 PROCEDURE — 1159F MED LIST DOCD IN RCRD: CPT | Performed by: INTERNAL MEDICINE

## 2024-11-20 PROCEDURE — 99213 OFFICE O/P EST LOW 20 MIN: CPT | Performed by: INTERNAL MEDICINE

## 2024-11-20 PROCEDURE — G8484 FLU IMMUNIZE NO ADMIN: HCPCS | Performed by: INTERNAL MEDICINE

## 2024-11-20 PROCEDURE — G2211 COMPLEX E/M VISIT ADD ON: HCPCS | Performed by: INTERNAL MEDICINE

## 2024-11-20 PROCEDURE — 1036F TOBACCO NON-USER: CPT | Performed by: INTERNAL MEDICINE

## 2024-11-20 PROCEDURE — 1090F PRES/ABSN URINE INCON ASSESS: CPT | Performed by: INTERNAL MEDICINE

## 2024-11-20 RX ORDER — METHIMAZOLE 5 MG/1
5 TABLET ORAL DAILY
Qty: 90 TABLET | Refills: 3 | Status: SHIPPED
Start: 2024-11-20 | End: 2024-11-21 | Stop reason: SDUPTHER

## 2024-11-20 ASSESSMENT — ENCOUNTER SYMPTOMS
BACK PAIN: 1
CONSTIPATION: 0
TROUBLE SWALLOWING: 0
DIARRHEA: 0
VOICE CHANGE: 0

## 2024-11-20 ASSESSMENT — PATIENT HEALTH QUESTIONNAIRE - PHQ9
2. FEELING DOWN, DEPRESSED OR HOPELESS: NOT AT ALL
1. LITTLE INTEREST OR PLEASURE IN DOING THINGS: NOT AT ALL
SUM OF ALL RESPONSES TO PHQ QUESTIONS 1-9: 0
SUM OF ALL RESPONSES TO PHQ9 QUESTIONS 1 & 2: 0

## 2024-11-20 NOTE — PROGRESS NOTES
DOUG Tse MD, [facility] ENDOCRINOLOGY   AND   THYROID NODULE CLINIC            Reason for visit: Follow-up of hyperthyroidism      ASSESSMENT AND PLAN:    1. Hyperthyroidism  She has been biochemically euthyroid on her current methimazole dose.  I will have her check thyroid function today.  If normal, she can return in 6 months with labs.  I will provide her with longitudinal care.  - methIMAzole (TAPAZOLE) 5 MG tablet; Take 1 tablet by mouth daily  Dispense: 90 tablet; Refill: 3  - TSH; Future  - T4, Free; Future  - T3; Future  - Hepatic Function Panel; Future    2. Multiple thyroid nodules  Ongoing ultrasound follow-up is not necessary.          Follow-up and Dispositions    Return in about 6 months (around 2025).               History of Present Illness:    THYROID NODULES  Nguyen Gibson is seen today in the THYROID NODULE CLINIC for follow-up of thyroid nodules and hyperthyroidism.  These were discovered as part of the evaluation of unintentional weight loss (she has now regained the lost weight).  There is not a history of radiation to the head/neck.  The patient does not have a family history of thyroid cancer.       Symptoms: See review of systems below     Prior imagin2016: Iodine 123 uptake and scan ([facility])- 24 hour uptake 19%.  Scan demonstrates asymmetric enlargement of the right lobe with a symmetrically increased uptake in the right lobe.     2016: Ultrasound ([facility])- Right lobe 3.4 x 2.1 x 2.4 cm, isthmus 0.2 cm, left lobe 3.3 x 1.4 x 1.8 cm.  2.1 cm complex nodule in the right lobe.  1.5 cm complex nodule in the left lobe.     3/10/2017: Ultrasound ([facility])- Right lobe 2.14 x 2.02 x 4.78 cm, isthmus 0.21 cm, left lobe 1.18 x 1.37 x 3.13 cm.  Heterogeneous echotexture.  Multiple nodules in both lobes.  The largest are as follows: 1.61 x 1.60 x 1.73 cm anechoic colloid cyst at the right upper pole.  0.98 x 1.48 x 1.76 cm complex hypoechoic colloid

## 2024-11-21 DIAGNOSIS — E05.90 HYPERTHYROIDISM: ICD-10-CM

## 2024-11-21 RX ORDER — METHIMAZOLE 5 MG/1
5 TABLET ORAL DAILY
Qty: 90 TABLET | Refills: 3 | Status: SHIPPED | OUTPATIENT
Start: 2024-11-21

## 2024-11-21 NOTE — PROGRESS NOTES
Please let Ms. Gibson know that her thyroid levels look great.  She should continue methimazole as she is currently taking it.

## 2024-11-25 ENCOUNTER — TELEMEDICINE (OUTPATIENT)
Dept: PRIMARY CARE CLINIC | Facility: CLINIC | Age: 84
End: 2024-11-25

## 2024-11-25 DIAGNOSIS — E78.00 HYPERCHOLESTEROLEMIA: ICD-10-CM

## 2024-11-25 DIAGNOSIS — E11.22 TYPE 2 DIABETES MELLITUS WITH STAGE 4 CHRONIC KIDNEY DISEASE, WITHOUT LONG-TERM CURRENT USE OF INSULIN (HCC): Primary | ICD-10-CM

## 2024-11-25 DIAGNOSIS — I10 ESSENTIAL HYPERTENSION WITH GOAL BLOOD PRESSURE LESS THAN 140/90: ICD-10-CM

## 2024-11-25 DIAGNOSIS — N18.4 CKD (CHRONIC KIDNEY DISEASE) STAGE 4, GFR 15-29 ML/MIN (HCC): ICD-10-CM

## 2024-11-25 DIAGNOSIS — N18.4 TYPE 2 DIABETES MELLITUS WITH STAGE 4 CHRONIC KIDNEY DISEASE, WITHOUT LONG-TERM CURRENT USE OF INSULIN (HCC): Primary | ICD-10-CM

## 2024-11-25 NOTE — PROGRESS NOTES
2024    TELEHEALTH EVALUATION -- Audio/Visual    The patient's daughter has observed fluctuations in the patient's blood sugar levels throughout the day. She notes that in the morning, the patient's blood sugar is typically within the normal range (100-130), but it escalates significantly by mid-morning (290-300), necessitating an additional insulin injection. The patient has encountered occasional episodes of hypoglycemia during the night, with a reported incident of blood sugar dropping to 70 on Thursday night. The patient has been on glipizide in the morning; however, there is a plan to discontinue this medication due to its potential to induce low blood sugar levels.    The patient administers 10 units of basal glargine insulin at night and utilizes Fiasp, a short-acting insulin, as per the chart provided. The patient's daughter confirms the nightly administration of 10 units of long-acting insulin, following the hospital and provider's prescription.    Attempts have been made to reduce the insulin injections to once daily, but currently, two injections are necessary. The daughter mentions that in instances of low blood sugar, she advises the patient to consume orange juice to elevate the blood sugar level. Additionally, she relies on the doctor-provided chart to determine the appropriate dosage of short-acting insulin (Fiasp) to administer when the patient's blood sugar level increases.          Nguyen Gibson (:  1940) has requested an audio/video evaluation for the following concern(s):    Chief Complaint   Patient presents with    Diabetes     BG level in the morning it is okay, midmorning it is in the 300s at night it Is okay but sometimes it drops to 60-70. Not controlled. She currently is on the basaglar nightly. And taking fiasp three times a day. PCP was planning on d/c glipizide.        Review of Systems    Prior to Visit Medications    Medication Sig Taking? Authorizing Provider

## 2024-12-11 ENCOUNTER — OFFICE VISIT (OUTPATIENT)
Dept: PRIMARY CARE CLINIC | Facility: CLINIC | Age: 84
End: 2024-12-11

## 2024-12-11 VITALS
DIASTOLIC BLOOD PRESSURE: 50 MMHG | OXYGEN SATURATION: 99 % | HEIGHT: 63 IN | TEMPERATURE: 97.9 F | WEIGHT: 151 LBS | BODY MASS INDEX: 26.75 KG/M2 | SYSTOLIC BLOOD PRESSURE: 132 MMHG | HEART RATE: 81 BPM

## 2024-12-11 DIAGNOSIS — I10 ESSENTIAL HYPERTENSION WITH GOAL BLOOD PRESSURE LESS THAN 140/90: ICD-10-CM

## 2024-12-11 DIAGNOSIS — C90.00 MULTIPLE MYELOMA, REMISSION STATUS UNSPECIFIED (HCC): ICD-10-CM

## 2024-12-11 DIAGNOSIS — E11.22 TYPE 2 DIABETES MELLITUS WITH STAGE 4 CHRONIC KIDNEY DISEASE, WITHOUT LONG-TERM CURRENT USE OF INSULIN (HCC): Primary | ICD-10-CM

## 2024-12-11 DIAGNOSIS — N18.4 CKD (CHRONIC KIDNEY DISEASE) STAGE 4, GFR 15-29 ML/MIN (HCC): ICD-10-CM

## 2024-12-11 DIAGNOSIS — N18.4 TYPE 2 DIABETES MELLITUS WITH STAGE 4 CHRONIC KIDNEY DISEASE, WITHOUT LONG-TERM CURRENT USE OF INSULIN (HCC): Primary | ICD-10-CM

## 2024-12-11 DIAGNOSIS — E78.00 HYPERCHOLESTEROLEMIA: ICD-10-CM

## 2024-12-11 RX ORDER — ROSUVASTATIN CALCIUM 20 MG/1
20 TABLET, COATED ORAL NIGHTLY
Qty: 90 TABLET | Refills: 3 | Status: SHIPPED | OUTPATIENT
Start: 2024-12-11

## 2024-12-11 RX ORDER — INSULIN GLARGINE 100 [IU]/ML
20 INJECTION, SOLUTION SUBCUTANEOUS NIGHTLY
Qty: 5 ADJUSTABLE DOSE PRE-FILLED PEN SYRINGE | Refills: 3 | Status: SHIPPED | OUTPATIENT
Start: 2024-12-11

## 2024-12-11 RX ORDER — CARVEDILOL 3.12 MG/1
3.12 TABLET ORAL 2 TIMES DAILY
Qty: 180 TABLET | Refills: 1 | Status: SHIPPED | OUTPATIENT
Start: 2024-12-11

## 2024-12-11 RX ORDER — AMLODIPINE BESYLATE 10 MG/1
10 TABLET ORAL DAILY
Qty: 90 TABLET | Refills: 3 | Status: SHIPPED | OUTPATIENT
Start: 2024-12-11

## 2024-12-11 RX ORDER — ACYCLOVIR 400 MG/1
TABLET ORAL
Qty: 2 EACH | Refills: 5 | Status: SHIPPED | OUTPATIENT
Start: 2024-12-11

## 2024-12-11 ASSESSMENT — PATIENT HEALTH QUESTIONNAIRE - PHQ9
SUM OF ALL RESPONSES TO PHQ QUESTIONS 1-9: 0
SUM OF ALL RESPONSES TO PHQ QUESTIONS 1-9: 0
1. LITTLE INTEREST OR PLEASURE IN DOING THINGS: NOT AT ALL
SUM OF ALL RESPONSES TO PHQ9 QUESTIONS 1 & 2: 0
SUM OF ALL RESPONSES TO PHQ QUESTIONS 1-9: 0
SUM OF ALL RESPONSES TO PHQ QUESTIONS 1-9: 0
2. FEELING DOWN, DEPRESSED OR HOPELESS: NOT AT ALL

## 2024-12-11 NOTE — PROGRESS NOTES
1.1  0.9 - 1.7 NG/DL Final    TSH, 3rd Generation 11/20/2024 1.590  0.270 - 4.200 uIU/mL Final       Educational documents were provided including; but, not limited to diagnosis, prognosis, recurrent, complications, monitoring, instructions, prevention, etc.    Patient aware of all medications (prescribed and recommended). Patient verbalized understanding. Patient declined all other medications (OTC, provided by clinic and prescribed) as well as additional testing/imaging/diagnostics at this time. Patient aware of risks associated with declining treatment/recommendations and/or non-compliance with plan of care.    *Side effects, adverse effects, risks versus benefits associated with medications prescribed/recommended were discussed with the patient. Patient verbalized understanding. All questions answered.    *Patient was encouraged to return to the clinic and/or PCP. Or seek emergent care if worsening signs and symptoms warrant immediate evaluation including, but not limited to HA, blurred vision, facial asymmetry, speech disturbance, difficulty with ambulation/gait, numbness, tingling, weakness, syncope, chest pain (with or without radiation), left arm pain, jaw pain, changes in hearing (loss), fever, unexplained sweating, malaise/fatigue, difficulty swallowing, mental changes (confusion, AMS), lightheadedness/dizziness, difficulty breathing, or shortness of breath.    I have reviewed the patient's medication list, past medical, family, social, and surgical history in detail and updated the patient record appropriately.    I have reviewed the patient's vital signs and discussed risks associated with any abnormal vital signs (during visit and following this visit) as well as appropriate parameters with the patient. Patient verbalized understanding. Patient agreed to seek emergent care if vital signs are above or below the parameters discussed.     Explanatory note: Be assured that the information provided to create

## 2024-12-17 ENCOUNTER — TELEPHONE (OUTPATIENT)
Dept: PRIMARY CARE CLINIC | Facility: CLINIC | Age: 84
End: 2024-12-17

## 2024-12-17 NOTE — TELEPHONE ENCOUNTER
Patient daughter Steph called and stated that her mother is having chest pains and weakness since yesterday.  I spoke to Dr. STEIN and he advised to have Ms. Ansari take her mother to the ER.   Patient daughter stated that she would take her mother to the the ER.

## 2025-01-08 ENCOUNTER — OFFICE VISIT (OUTPATIENT)
Dept: PRIMARY CARE CLINIC | Facility: CLINIC | Age: 85
End: 2025-01-08

## 2025-01-08 VITALS
WEIGHT: 165 LBS | HEART RATE: 75 BPM | TEMPERATURE: 97.2 F | BODY MASS INDEX: 29.23 KG/M2 | DIASTOLIC BLOOD PRESSURE: 50 MMHG | HEIGHT: 63 IN | SYSTOLIC BLOOD PRESSURE: 132 MMHG | OXYGEN SATURATION: 100 %

## 2025-01-08 DIAGNOSIS — D63.1 ANEMIA DUE TO STAGE 4 CHRONIC KIDNEY DISEASE (HCC): ICD-10-CM

## 2025-01-08 DIAGNOSIS — R07.9 CHEST PAIN, UNSPECIFIED TYPE: Primary | ICD-10-CM

## 2025-01-08 DIAGNOSIS — E11.22 TYPE 2 DIABETES MELLITUS WITH STAGE 4 CHRONIC KIDNEY DISEASE, WITHOUT LONG-TERM CURRENT USE OF INSULIN (HCC): ICD-10-CM

## 2025-01-08 DIAGNOSIS — N18.4 ANEMIA DUE TO STAGE 4 CHRONIC KIDNEY DISEASE (HCC): ICD-10-CM

## 2025-01-08 DIAGNOSIS — C90.00 MULTIPLE MYELOMA, REMISSION STATUS UNSPECIFIED (HCC): ICD-10-CM

## 2025-01-08 DIAGNOSIS — F41.9 ANXIETY: ICD-10-CM

## 2025-01-08 DIAGNOSIS — N18.4 TYPE 2 DIABETES MELLITUS WITH STAGE 4 CHRONIC KIDNEY DISEASE, WITHOUT LONG-TERM CURRENT USE OF INSULIN (HCC): ICD-10-CM

## 2025-01-08 LAB
ALBUMIN SERPL-MCNC: 3.3 G/DL (ref 3.2–4.6)
ALBUMIN/GLOB SERPL: 1 (ref 1–1.9)
ALP SERPL-CCNC: 80 U/L (ref 35–104)
ALT SERPL-CCNC: 27 U/L (ref 8–45)
ANION GAP SERPL CALC-SCNC: 15 MMOL/L (ref 7–16)
AST SERPL-CCNC: 23 U/L (ref 15–37)
BASOPHILS # BLD: 0.07 K/UL (ref 0–0.2)
BASOPHILS NFR BLD: 0.8 % (ref 0–2)
BILIRUB SERPL-MCNC: 0.3 MG/DL (ref 0–1.2)
BUN SERPL-MCNC: 21 MG/DL (ref 8–23)
CALCIUM SERPL-MCNC: 9.7 MG/DL (ref 8.8–10.2)
CHLORIDE SERPL-SCNC: 105 MMOL/L (ref 98–107)
CO2 SERPL-SCNC: 23 MMOL/L (ref 20–29)
CREAT SERPL-MCNC: 1.6 MG/DL (ref 0.6–1.1)
CRP SERPL HS-MCNC: 9 MG/L (ref 0–3)
DIFFERENTIAL METHOD BLD: ABNORMAL
EOSINOPHIL # BLD: 0.37 K/UL (ref 0–0.8)
EOSINOPHIL NFR BLD: 4.3 % (ref 0.5–7.8)
ERYTHROCYTE [DISTWIDTH] IN BLOOD BY AUTOMATED COUNT: 15.8 % (ref 11.9–14.6)
GLOBULIN SER CALC-MCNC: 3.4 G/DL (ref 2.3–3.5)
GLUCOSE SERPL-MCNC: 128 MG/DL (ref 70–99)
HCT VFR BLD AUTO: 30.4 % (ref 35.8–46.3)
HGB BLD-MCNC: 10.3 G/DL (ref 11.7–15.4)
IMM GRANULOCYTES # BLD AUTO: 0.02 K/UL (ref 0–0.5)
IMM GRANULOCYTES NFR BLD AUTO: 0.2 % (ref 0–5)
LYMPHOCYTES # BLD: 1.63 K/UL (ref 0.5–4.6)
LYMPHOCYTES NFR BLD: 19 % (ref 13–44)
MCH RBC QN AUTO: 29.3 PG (ref 26.1–32.9)
MCHC RBC AUTO-ENTMCNC: 33.9 G/DL (ref 31.4–35)
MCV RBC AUTO: 86.4 FL (ref 82–102)
MONOCYTES # BLD: 0.85 K/UL (ref 0.1–1.3)
MONOCYTES NFR BLD: 9.9 % (ref 4–12)
NEUTS SEG # BLD: 5.62 K/UL (ref 1.7–8.2)
NEUTS SEG NFR BLD: 65.8 % (ref 43–78)
NRBC # BLD: 0 K/UL (ref 0–0.2)
PLATELET # BLD AUTO: 261 K/UL (ref 150–450)
PMV BLD AUTO: 10.3 FL (ref 9.4–12.3)
POTASSIUM SERPL-SCNC: 3.7 MMOL/L (ref 3.5–5.1)
PROT SERPL-MCNC: 6.7 G/DL (ref 6.3–8.2)
RBC # BLD AUTO: 3.52 M/UL (ref 4.05–5.2)
SODIUM SERPL-SCNC: 143 MMOL/L (ref 136–145)
WBC # BLD AUTO: 8.6 K/UL (ref 4.3–11.1)

## 2025-01-08 RX ORDER — CLONAZEPAM 0.5 MG/1
.25-.5 TABLET ORAL NIGHTLY PRN
Qty: 15 TABLET | Refills: 0 | Status: SHIPPED | OUTPATIENT
Start: 2025-01-08 | End: 2025-02-07

## 2025-01-08 ASSESSMENT — PATIENT HEALTH QUESTIONNAIRE - PHQ9
2. FEELING DOWN, DEPRESSED OR HOPELESS: NOT AT ALL
SUM OF ALL RESPONSES TO PHQ QUESTIONS 1-9: 0
SUM OF ALL RESPONSES TO PHQ9 QUESTIONS 1 & 2: 0
1. LITTLE INTEREST OR PLEASURE IN DOING THINGS: NOT AT ALL

## 2025-01-08 NOTE — PROGRESS NOTES
were provided including; but, not limited to diagnosis, prognosis, recurrent, complications, monitoring, instructions, prevention, etc.    Patient aware of all medications (prescribed and recommended). Patient verbalized understanding. Patient declined all other medications (OTC, provided by clinic and prescribed) as well as additional testing/imaging/diagnostics at this time. Patient aware of risks associated with declining treatment/recommendations and/or non-compliance with plan of care.    *Side effects, adverse effects, risks versus benefits associated with medications prescribed/recommended were discussed with the patient. Patient verbalized understanding. All questions answered.    *Patient was encouraged to return to the clinic and/or PCP. Or seek emergent care if worsening signs and symptoms warrant immediate evaluation including, but not limited to HA, blurred vision, facial asymmetry, speech disturbance, difficulty with ambulation/gait, numbness, tingling, weakness, syncope, chest pain (with or without radiation), left arm pain, jaw pain, changes in hearing (loss), fever, unexplained sweating, malaise/fatigue, difficulty swallowing, mental changes (confusion, AMS), lightheadedness/dizziness, difficulty breathing, or shortness of breath.    I have reviewed the patient's medication list, past medical, family, social, and surgical history in detail and updated the patient record appropriately.    I have reviewed the patient's vital signs and discussed risks associated with any abnormal vital signs (during visit and following this visit) as well as appropriate parameters with the patient. Patient verbalized understanding. Patient agreed to seek emergent care if vital signs are above or below the parameters discussed.     Explanatory note: Be assured that the information provided to create your medical record comes from your provider. The written transcription portion of this note is prepared electronically by

## 2025-01-10 RX ORDER — GLIPIZIDE 10 MG/1
TABLET, FILM COATED, EXTENDED RELEASE ORAL
Qty: 90 TABLET | Refills: 3 | OUTPATIENT
Start: 2025-01-10

## 2025-01-16 DIAGNOSIS — E05.90 HYPERTHYROIDISM: ICD-10-CM

## 2025-01-16 RX ORDER — METHIMAZOLE 5 MG/1
5 TABLET ORAL DAILY
Qty: 90 TABLET | Refills: 3 | Status: SHIPPED | OUTPATIENT
Start: 2025-01-16

## 2025-02-03 RX ORDER — HYDRALAZINE HYDROCHLORIDE 10 MG/1
10 TABLET, FILM COATED ORAL 3 TIMES DAILY
Qty: 90 TABLET | Refills: 2 | OUTPATIENT
Start: 2025-02-03

## 2025-02-12 ENCOUNTER — OFFICE VISIT (OUTPATIENT)
Dept: PRIMARY CARE CLINIC | Facility: CLINIC | Age: 85
End: 2025-02-12
Payer: MEDICARE

## 2025-02-12 VITALS
HEIGHT: 63 IN | TEMPERATURE: 97.4 F | BODY MASS INDEX: 25.52 KG/M2 | WEIGHT: 144 LBS | SYSTOLIC BLOOD PRESSURE: 141 MMHG | OXYGEN SATURATION: 99 % | DIASTOLIC BLOOD PRESSURE: 48 MMHG | HEART RATE: 75 BPM

## 2025-02-12 DIAGNOSIS — D63.1 ANEMIA DUE TO STAGE 4 CHRONIC KIDNEY DISEASE (HCC): ICD-10-CM

## 2025-02-12 DIAGNOSIS — N18.4 ANEMIA DUE TO STAGE 4 CHRONIC KIDNEY DISEASE (HCC): ICD-10-CM

## 2025-02-12 DIAGNOSIS — I10 ESSENTIAL HYPERTENSION WITH GOAL BLOOD PRESSURE LESS THAN 140/90: Primary | ICD-10-CM

## 2025-02-12 DIAGNOSIS — E78.00 HYPERCHOLESTEROLEMIA: ICD-10-CM

## 2025-02-12 DIAGNOSIS — E11.22 TYPE 2 DIABETES MELLITUS WITH STAGE 4 CHRONIC KIDNEY DISEASE, WITHOUT LONG-TERM CURRENT USE OF INSULIN (HCC): ICD-10-CM

## 2025-02-12 DIAGNOSIS — N18.4 TYPE 2 DIABETES MELLITUS WITH STAGE 4 CHRONIC KIDNEY DISEASE, WITHOUT LONG-TERM CURRENT USE OF INSULIN (HCC): ICD-10-CM

## 2025-02-12 DIAGNOSIS — C90.00 MULTIPLE MYELOMA, REMISSION STATUS UNSPECIFIED (HCC): ICD-10-CM

## 2025-02-12 PROCEDURE — G8417 CALC BMI ABV UP PARAM F/U: HCPCS | Performed by: FAMILY MEDICINE

## 2025-02-12 PROCEDURE — 99214 OFFICE O/P EST MOD 30 MIN: CPT | Performed by: FAMILY MEDICINE

## 2025-02-12 PROCEDURE — 1159F MED LIST DOCD IN RCRD: CPT | Performed by: FAMILY MEDICINE

## 2025-02-12 PROCEDURE — 1123F ACP DISCUSS/DSCN MKR DOCD: CPT | Performed by: FAMILY MEDICINE

## 2025-02-12 PROCEDURE — 1036F TOBACCO NON-USER: CPT | Performed by: FAMILY MEDICINE

## 2025-02-12 PROCEDURE — 1090F PRES/ABSN URINE INCON ASSESS: CPT | Performed by: FAMILY MEDICINE

## 2025-02-12 PROCEDURE — G8427 DOCREV CUR MEDS BY ELIG CLIN: HCPCS | Performed by: FAMILY MEDICINE

## 2025-02-12 RX ORDER — HYDRALAZINE HYDROCHLORIDE 10 MG/1
10 TABLET, FILM COATED ORAL 3 TIMES DAILY
Qty: 90 TABLET | Refills: 2 | Status: SHIPPED | OUTPATIENT
Start: 2025-02-12 | End: 2026-02-12

## 2025-02-12 RX ORDER — CARVEDILOL 3.12 MG/1
3.12 TABLET ORAL 2 TIMES DAILY
Qty: 180 TABLET | Refills: 1 | Status: SHIPPED | OUTPATIENT
Start: 2025-02-12

## 2025-02-12 RX ORDER — AMLODIPINE BESYLATE 10 MG/1
10 TABLET ORAL DAILY
Qty: 90 TABLET | Refills: 3 | Status: SHIPPED | OUTPATIENT
Start: 2025-02-12

## 2025-02-12 SDOH — ECONOMIC STABILITY: FOOD INSECURITY: WITHIN THE PAST 12 MONTHS, THE FOOD YOU BOUGHT JUST DIDN'T LAST AND YOU DIDN'T HAVE MONEY TO GET MORE.: NEVER TRUE

## 2025-02-12 SDOH — ECONOMIC STABILITY: FOOD INSECURITY: WITHIN THE PAST 12 MONTHS, YOU WORRIED THAT YOUR FOOD WOULD RUN OUT BEFORE YOU GOT MONEY TO BUY MORE.: NEVER TRUE

## 2025-02-12 ASSESSMENT — PATIENT HEALTH QUESTIONNAIRE - PHQ9
2. FEELING DOWN, DEPRESSED OR HOPELESS: NOT AT ALL
SUM OF ALL RESPONSES TO PHQ QUESTIONS 1-9: 0
SUM OF ALL RESPONSES TO PHQ QUESTIONS 1-9: 0
1. LITTLE INTEREST OR PLEASURE IN DOING THINGS: NOT AT ALL
SUM OF ALL RESPONSES TO PHQ9 QUESTIONS 1 & 2: 0
SUM OF ALL RESPONSES TO PHQ QUESTIONS 1-9: 0
SUM OF ALL RESPONSES TO PHQ QUESTIONS 1-9: 0

## 2025-02-12 NOTE — PROGRESS NOTES
5  [facility] PHYSICIAN SERVICES  [facility] PRIMARY CARE  [address] [place] [address] DR [place] [address]  Dept: [phone]       Patient: Nguyen Gibson  YOB: 1940  Patient Age: 84 y.o.  Patient Sex: female  Medical Record: [id number]  Visit Date: 02/12/2025    Family Practice Clinic Note    Chief Complaint   Patient presents with    Hypertension     Patient is requesting refills of her medication.       History of Present Illness  The patient is an 84-year-old female who presents for evaluation of multiple myeloma, GERD, hypertension, and diabetes mellitus.    She reports a general feeling of malaise, characterized by fatigue and body aches. She continues to be under the care of a hematologist, with a scheduled appointment tomorrow. Her current medication regimen includes a weekly dose of steroids, which she believes may be contributing to her symptoms. She is uncertain about the duration of this treatment and plans to discuss it with her hematologist during their upcoming appointment. She also mentions that her hemoglobin levels have improved.    She has been prescribed pantoprazole 40 mg for gastrointestinal discomfort, but notes that it induces drowsiness. She intends to consult with her physician about discontinuing this medication.    She monitors her blood pressure at home, which typically remains within normal limits.    She uses a Dexcom G7 device to monitor her blood glucose levels. She administers 10 units of insulin when her blood sugar exceeds 300, which effectively reduces her levels. She also takes 20 units of insulin daily in the morning. She reports that her blood sugar levels do not frequently reach 300, but they tend to increase when she starts her steroid treatment. She has not experienced any hypoglycemic episodes in the past month. Her blood sugar was 110 this morning. She has not had breakfast today. She typically consumes oatmeal and blueberries for breakfast and considers

## 2025-02-13 ENCOUNTER — TELEPHONE (OUTPATIENT)
Dept: PRIMARY CARE CLINIC | Facility: CLINIC | Age: 85
End: 2025-02-13

## 2025-02-13 NOTE — TELEPHONE ENCOUNTER
Patient daughter Steph called and would like a call concerning her mother results from her appointment yesterday 02/12/2025 . She can be reached at [phone].

## 2025-02-19 ENCOUNTER — TELEPHONE (OUTPATIENT)
Dept: PRIMARY CARE CLINIC | Facility: CLINIC | Age: 85
End: 2025-02-19

## 2025-02-19 ENCOUNTER — NURSE ONLY (OUTPATIENT)
Dept: PRIMARY CARE CLINIC | Facility: CLINIC | Age: 85
End: 2025-02-19
Payer: MEDICARE

## 2025-02-19 DIAGNOSIS — E11.22 TYPE 2 DIABETES MELLITUS WITH STAGE 3B CHRONIC KIDNEY DISEASE, WITHOUT LONG-TERM CURRENT USE OF INSULIN (HCC): Primary | ICD-10-CM

## 2025-02-19 DIAGNOSIS — N18.32 TYPE 2 DIABETES MELLITUS WITH STAGE 3B CHRONIC KIDNEY DISEASE, WITHOUT LONG-TERM CURRENT USE OF INSULIN (HCC): Primary | ICD-10-CM

## 2025-02-19 PROCEDURE — 99212 OFFICE O/P EST SF 10 MIN: CPT | Performed by: FAMILY MEDICINE

## 2025-02-19 PROCEDURE — 1123F ACP DISCUSS/DSCN MKR DOCD: CPT | Performed by: FAMILY MEDICINE

## 2025-02-19 NOTE — TELEPHONE ENCOUNTER
Pts daughter would like clarification how much insulin Ms. Gibson should be taking. Please advise.     [phone]

## 2025-02-27 NOTE — TELEPHONE ENCOUNTER
Left message to call. Per Kelly Clark NP patient should increase Basaglar 2-4 units every day until max dose of 40 units daily or until blood sugar is consistently under 250. May continue to use 10 units of Fiasp at mid day until blood sugar is better controlled.

## 2025-03-18 NOTE — TELEPHONE ENCOUNTER
Pt called [facility]

## 2025-04-09 ENCOUNTER — CLINICAL SUPPORT (OUTPATIENT)
Dept: PRIMARY CARE CLINIC | Facility: CLINIC | Age: 85
End: 2025-04-09
Payer: MEDICARE

## 2025-04-09 DIAGNOSIS — N18.32 TYPE 2 DIABETES MELLITUS WITH STAGE 3B CHRONIC KIDNEY DISEASE, WITHOUT LONG-TERM CURRENT USE OF INSULIN (HCC): Primary | ICD-10-CM

## 2025-04-09 DIAGNOSIS — E11.22 TYPE 2 DIABETES MELLITUS WITH STAGE 3B CHRONIC KIDNEY DISEASE, WITHOUT LONG-TERM CURRENT USE OF INSULIN (HCC): Primary | ICD-10-CM

## 2025-04-09 PROCEDURE — 99212 OFFICE O/P EST SF 10 MIN: CPT | Performed by: FAMILY MEDICINE

## 2025-04-09 PROCEDURE — 1123F ACP DISCUSS/DSCN MKR DOCD: CPT | Performed by: FAMILY MEDICINE

## 2025-04-18 ENCOUNTER — TELEPHONE (OUTPATIENT)
Dept: PRIMARY CARE CLINIC | Facility: CLINIC | Age: 85
End: 2025-04-18

## 2025-04-18 DIAGNOSIS — N18.4 TYPE 2 DIABETES MELLITUS WITH STAGE 4 CHRONIC KIDNEY DISEASE, WITHOUT LONG-TERM CURRENT USE OF INSULIN (HCC): ICD-10-CM

## 2025-04-18 DIAGNOSIS — E11.22 TYPE 2 DIABETES MELLITUS WITH STAGE 4 CHRONIC KIDNEY DISEASE, WITHOUT LONG-TERM CURRENT USE OF INSULIN (HCC): ICD-10-CM

## 2025-04-18 RX ORDER — ACYCLOVIR 400 MG/1
TABLET ORAL
Qty: 3 EACH | Refills: 11 | Status: SHIPPED | OUTPATIENT
Start: 2025-04-18

## 2025-04-18 NOTE — TELEPHONE ENCOUNTER
Received on-call message: Patient is requesting medication refill for Dexcom G7 sensors only.     Last OV: 02/12/2025  Last Labs: 03/27/2025    Received message at 5:04 PM.  Called patient via [employer] at 5:12 PM.    Refills sent to: [facility]

## 2025-04-22 ENCOUNTER — TELEPHONE (OUTPATIENT)
Dept: PRIMARY CARE CLINIC | Facility: CLINIC | Age: 85
End: 2025-04-22

## 2025-04-24 ENCOUNTER — TELEPHONE (OUTPATIENT)
Dept: PRIMARY CARE CLINIC | Facility: CLINIC | Age: 85
End: 2025-04-24

## 2025-04-24 NOTE — TELEPHONE ENCOUNTER
Patient to be seen by another physician that will be doing a blood draw May 1. She would like to know if she can get those completed and we can review them instead of coming here on May 7

## 2025-05-07 ENCOUNTER — CLINICAL SUPPORT (OUTPATIENT)
Dept: PRIMARY CARE CLINIC | Facility: CLINIC | Age: 85
End: 2025-05-07
Payer: MEDICARE

## 2025-05-07 ENCOUNTER — LAB (OUTPATIENT)
Dept: PRIMARY CARE CLINIC | Facility: CLINIC | Age: 85
End: 2025-05-07

## 2025-05-07 DIAGNOSIS — Z13.228 SCREENING FOR METABOLIC DISORDER: ICD-10-CM

## 2025-05-07 DIAGNOSIS — R79.9 ABNORMAL BLOOD CHEMISTRY: ICD-10-CM

## 2025-05-07 DIAGNOSIS — R79.9 ABNORMAL BLOOD CHEMISTRY: Primary | ICD-10-CM

## 2025-05-07 DIAGNOSIS — Z79.899 ENCOUNTER FOR LONG-TERM (CURRENT) USE OF MEDICATIONS: ICD-10-CM

## 2025-05-07 DIAGNOSIS — Z13.0 SCREENING FOR DEFICIENCY ANEMIA: ICD-10-CM

## 2025-05-07 DIAGNOSIS — Z13.0 SCREENING FOR ENDOCRINE, NUTRITIONAL, METABOLIC AND IMMUNITY DISORDER: ICD-10-CM

## 2025-05-07 DIAGNOSIS — Z13.228 SCREENING FOR ENDOCRINE, NUTRITIONAL, METABOLIC AND IMMUNITY DISORDER: ICD-10-CM

## 2025-05-07 DIAGNOSIS — Z13.29 SCREENING FOR ENDOCRINE, NUTRITIONAL, METABOLIC AND IMMUNITY DISORDER: ICD-10-CM

## 2025-05-07 DIAGNOSIS — Z13.21 SCREENING FOR ENDOCRINE, NUTRITIONAL, METABOLIC AND IMMUNITY DISORDER: ICD-10-CM

## 2025-05-07 DIAGNOSIS — Z13.220 SCREENING FOR LIPID DISORDERS: ICD-10-CM

## 2025-05-07 DIAGNOSIS — R79.89 OTHER SPECIFIED ABNORMAL FINDINGS OF BLOOD CHEMISTRY: ICD-10-CM

## 2025-05-07 DIAGNOSIS — E11.22 TYPE 2 DIABETES MELLITUS WITH STAGE 3B CHRONIC KIDNEY DISEASE, WITHOUT LONG-TERM CURRENT USE OF INSULIN (HCC): Primary | ICD-10-CM

## 2025-05-07 DIAGNOSIS — N18.32 TYPE 2 DIABETES MELLITUS WITH STAGE 3B CHRONIC KIDNEY DISEASE, WITHOUT LONG-TERM CURRENT USE OF INSULIN (HCC): Primary | ICD-10-CM

## 2025-05-07 LAB
ALBUMIN SERPL-MCNC: 3.8 G/DL (ref 3.2–4.6)
ALBUMIN/GLOB SERPL: 1.2 (ref 1–1.9)
ALP SERPL-CCNC: 77 U/L (ref 35–104)
ALT SERPL-CCNC: 99 U/L (ref 8–45)
ANION GAP SERPL CALC-SCNC: 15 MMOL/L (ref 7–16)
AST SERPL-CCNC: 52 U/L (ref 15–37)
BASOPHILS # BLD: 0.02 K/UL (ref 0–0.2)
BASOPHILS NFR BLD: 0.5 % (ref 0–2)
BILIRUB SERPL-MCNC: 0.3 MG/DL (ref 0–1.2)
BUN SERPL-MCNC: 22 MG/DL (ref 8–23)
CALCIUM SERPL-MCNC: 9.9 MG/DL (ref 8.8–10.2)
CHLORIDE SERPL-SCNC: 103 MMOL/L (ref 98–107)
CHOLEST SERPL-MCNC: 163 MG/DL (ref 0–200)
CO2 SERPL-SCNC: 25 MMOL/L (ref 20–29)
CREAT SERPL-MCNC: 1.29 MG/DL (ref 0.6–1.1)
DIFFERENTIAL METHOD BLD: ABNORMAL
EOSINOPHIL # BLD: 0.08 K/UL (ref 0–0.8)
EOSINOPHIL NFR BLD: 2 % (ref 0.5–7.8)
ERYTHROCYTE [DISTWIDTH] IN BLOOD BY AUTOMATED COUNT: 15.3 % (ref 11.9–14.6)
GLOBULIN SER CALC-MCNC: 3.1 G/DL (ref 2.3–3.5)
GLUCOSE SERPL-MCNC: 80 MG/DL (ref 70–99)
HCT VFR BLD AUTO: 31.3 % (ref 35.8–46.3)
HDLC SERPL-MCNC: 50 MG/DL (ref 40–60)
HDLC SERPL: 3.2 (ref 0–5)
HGB BLD-MCNC: 10.3 G/DL (ref 11.7–15.4)
IMM GRANULOCYTES # BLD AUTO: 0.01 K/UL (ref 0–0.5)
IMM GRANULOCYTES NFR BLD AUTO: 0.2 % (ref 0–5)
LDLC SERPL CALC-MCNC: 90 MG/DL (ref 0–100)
LYMPHOCYTES # BLD: 0.69 K/UL (ref 0.5–4.6)
LYMPHOCYTES NFR BLD: 17 % (ref 13–44)
MCH RBC QN AUTO: 28.9 PG (ref 26.1–32.9)
MCHC RBC AUTO-ENTMCNC: 32.9 G/DL (ref 31.4–35)
MCV RBC AUTO: 87.7 FL (ref 82–102)
MONOCYTES # BLD: 0.44 K/UL (ref 0.1–1.3)
MONOCYTES NFR BLD: 10.9 % (ref 4–12)
NEUTS SEG # BLD: 2.81 K/UL (ref 1.7–8.2)
NEUTS SEG NFR BLD: 69.4 % (ref 43–78)
NRBC # BLD: 0 K/UL (ref 0–0.2)
PLATELET # BLD AUTO: 215 K/UL (ref 150–450)
PMV BLD AUTO: 10.4 FL (ref 9.4–12.3)
POTASSIUM SERPL-SCNC: 4.2 MMOL/L (ref 3.5–5.1)
PROT SERPL-MCNC: 6.9 G/DL (ref 6.3–8.2)
RBC # BLD AUTO: 3.57 M/UL (ref 4.05–5.2)
SODIUM SERPL-SCNC: 142 MMOL/L (ref 136–145)
TRIGL SERPL-MCNC: 113 MG/DL (ref 0–150)
VLDLC SERPL CALC-MCNC: 23 MG/DL (ref 6–23)
WBC # BLD AUTO: 4.1 K/UL (ref 4.3–11.1)

## 2025-05-07 PROCEDURE — 1123F ACP DISCUSS/DSCN MKR DOCD: CPT | Performed by: FAMILY MEDICINE

## 2025-05-07 PROCEDURE — 99212 OFFICE O/P EST SF 10 MIN: CPT | Performed by: FAMILY MEDICINE

## 2025-05-12 DIAGNOSIS — E11.22 TYPE 2 DIABETES MELLITUS WITH STAGE 4 CHRONIC KIDNEY DISEASE, WITHOUT LONG-TERM CURRENT USE OF INSULIN (HCC): ICD-10-CM

## 2025-05-12 DIAGNOSIS — N18.4 TYPE 2 DIABETES MELLITUS WITH STAGE 4 CHRONIC KIDNEY DISEASE, WITHOUT LONG-TERM CURRENT USE OF INSULIN (HCC): ICD-10-CM

## 2025-05-12 RX ORDER — INSULIN GLARGINE 100 [IU]/ML
20 INJECTION, SOLUTION SUBCUTANEOUS NIGHTLY
Qty: 5 ADJUSTABLE DOSE PRE-FILLED PEN SYRINGE | Refills: 3 | Status: SHIPPED | OUTPATIENT
Start: 2025-05-12

## 2025-05-12 RX ORDER — INSULIN GLARGINE-YFGN 100 [IU]/ML
20 INJECTION, SOLUTION SUBCUTANEOUS NIGHTLY
Qty: 3 ML | Refills: 5 | Status: SHIPPED | OUTPATIENT
Start: 2025-05-12

## 2025-05-12 RX ORDER — INSULIN GLARGINE 100 [IU]/ML
20 INJECTION, SOLUTION SUBCUTANEOUS NIGHTLY
Qty: 5 ADJUSTABLE DOSE PRE-FILLED PEN SYRINGE | Refills: 3 | Status: SHIPPED | OUTPATIENT
Start: 2025-05-12 | End: 2025-05-12 | Stop reason: SDUPTHER

## 2025-05-12 RX ORDER — INSULIN GLARGINE 100 [IU]/ML
20 INJECTION, SOLUTION SUBCUTANEOUS NIGHTLY
Qty: 5 ADJUSTABLE DOSE PRE-FILLED PEN SYRINGE | Refills: 3 | Status: CANCELLED | OUTPATIENT
Start: 2025-05-12

## 2025-05-12 RX ORDER — INSULIN GLARGINE-YFGN 100 [IU]/ML
INJECTION, SOLUTION SUBCUTANEOUS
Status: CANCELLED | OUTPATIENT
Start: 2025-05-12

## 2025-05-12 NOTE — TELEPHONE ENCOUNTER
After hour phone call for refill of insulin I called back and according to patient Dr. Ureña took care of it and prescription was sent by him already

## 2025-05-12 NOTE — TELEPHONE ENCOUNTER
Patient insurance will not pay for the basaglar kwikpen.  Patient insurance will cover Insulin glargine . Could you please sent the new medication to pharmacy Pharmacist can be reached [phone]     [facility]

## 2025-05-12 NOTE — TELEPHONE ENCOUNTER
Pt called for a refill of insulin glargine injection pen to the [facility].  Pt is completely out

## 2025-05-13 DIAGNOSIS — I10 ESSENTIAL HYPERTENSION WITH GOAL BLOOD PRESSURE LESS THAN 140/90: ICD-10-CM

## 2025-05-13 RX ORDER — HYDRALAZINE HYDROCHLORIDE 10 MG/1
10 TABLET, FILM COATED ORAL 3 TIMES DAILY
Qty: 90 TABLET | Refills: 2 | OUTPATIENT
Start: 2025-05-13

## 2025-05-14 ENCOUNTER — OFFICE VISIT (OUTPATIENT)
Dept: PRIMARY CARE CLINIC | Facility: CLINIC | Age: 85
End: 2025-05-14
Payer: MEDICARE

## 2025-05-14 VITALS
OXYGEN SATURATION: 100 % | WEIGHT: 156.5 LBS | HEART RATE: 80 BPM | TEMPERATURE: 97.3 F | SYSTOLIC BLOOD PRESSURE: 169 MMHG | DIASTOLIC BLOOD PRESSURE: 73 MMHG | BODY MASS INDEX: 27.73 KG/M2 | HEIGHT: 63 IN

## 2025-05-14 DIAGNOSIS — N18.4 TYPE 2 DIABETES MELLITUS WITH STAGE 4 CHRONIC KIDNEY DISEASE, WITHOUT LONG-TERM CURRENT USE OF INSULIN (HCC): ICD-10-CM

## 2025-05-14 DIAGNOSIS — E05.90 HYPERTHYROIDISM: ICD-10-CM

## 2025-05-14 DIAGNOSIS — I10 ESSENTIAL HYPERTENSION WITH GOAL BLOOD PRESSURE LESS THAN 140/90: ICD-10-CM

## 2025-05-14 DIAGNOSIS — N18.4 CKD (CHRONIC KIDNEY DISEASE) STAGE 4, GFR 15-29 ML/MIN (HCC): ICD-10-CM

## 2025-05-14 DIAGNOSIS — C90.00 MULTIPLE MYELOMA, REMISSION STATUS UNSPECIFIED (HCC): ICD-10-CM

## 2025-05-14 DIAGNOSIS — R79.89 ELEVATED LFTS: ICD-10-CM

## 2025-05-14 DIAGNOSIS — E78.00 HYPERCHOLESTEROLEMIA: ICD-10-CM

## 2025-05-14 DIAGNOSIS — Z00.00 MEDICARE ANNUAL WELLNESS VISIT, SUBSEQUENT: Primary | ICD-10-CM

## 2025-05-14 DIAGNOSIS — D63.1 ANEMIA DUE TO STAGE 4 CHRONIC KIDNEY DISEASE (HCC): ICD-10-CM

## 2025-05-14 DIAGNOSIS — E04.2 MULTIPLE THYROID NODULES: ICD-10-CM

## 2025-05-14 DIAGNOSIS — N18.4 ANEMIA DUE TO STAGE 4 CHRONIC KIDNEY DISEASE (HCC): ICD-10-CM

## 2025-05-14 DIAGNOSIS — E11.22 TYPE 2 DIABETES MELLITUS WITH STAGE 4 CHRONIC KIDNEY DISEASE, WITHOUT LONG-TERM CURRENT USE OF INSULIN (HCC): ICD-10-CM

## 2025-05-14 PROCEDURE — 1123F ACP DISCUSS/DSCN MKR DOCD: CPT | Performed by: FAMILY MEDICINE

## 2025-05-14 PROCEDURE — 1159F MED LIST DOCD IN RCRD: CPT | Performed by: FAMILY MEDICINE

## 2025-05-14 PROCEDURE — G2211 COMPLEX E/M VISIT ADD ON: HCPCS | Performed by: FAMILY MEDICINE

## 2025-05-14 PROCEDURE — 1036F TOBACCO NON-USER: CPT | Performed by: FAMILY MEDICINE

## 2025-05-14 PROCEDURE — 1160F RVW MEDS BY RX/DR IN RCRD: CPT | Performed by: FAMILY MEDICINE

## 2025-05-14 PROCEDURE — G8427 DOCREV CUR MEDS BY ELIG CLIN: HCPCS | Performed by: FAMILY MEDICINE

## 2025-05-14 PROCEDURE — G8417 CALC BMI ABV UP PARAM F/U: HCPCS | Performed by: FAMILY MEDICINE

## 2025-05-14 PROCEDURE — 99214 OFFICE O/P EST MOD 30 MIN: CPT | Performed by: FAMILY MEDICINE

## 2025-05-14 PROCEDURE — 1090F PRES/ABSN URINE INCON ASSESS: CPT | Performed by: FAMILY MEDICINE

## 2025-05-14 PROCEDURE — G0439 PPPS, SUBSEQ VISIT: HCPCS | Performed by: FAMILY MEDICINE

## 2025-05-14 RX ORDER — HYDRALAZINE HYDROCHLORIDE 10 MG/1
10 TABLET, FILM COATED ORAL 3 TIMES DAILY
Qty: 90 TABLET | Refills: 2 | OUTPATIENT
Start: 2025-05-14

## 2025-05-14 SDOH — ECONOMIC STABILITY: FOOD INSECURITY: WITHIN THE PAST 12 MONTHS, YOU WORRIED THAT YOUR FOOD WOULD RUN OUT BEFORE YOU GOT MONEY TO BUY MORE.: NEVER TRUE

## 2025-05-14 SDOH — ECONOMIC STABILITY: FOOD INSECURITY: WITHIN THE PAST 12 MONTHS, THE FOOD YOU BOUGHT JUST DIDN'T LAST AND YOU DIDN'T HAVE MONEY TO GET MORE.: NEVER TRUE

## 2025-05-14 ASSESSMENT — PATIENT HEALTH QUESTIONNAIRE - PHQ9
SUM OF ALL RESPONSES TO PHQ QUESTIONS 1-9: 0
2. FEELING DOWN, DEPRESSED OR HOPELESS: NOT AT ALL
SUM OF ALL RESPONSES TO PHQ QUESTIONS 1-9: 0
1. LITTLE INTEREST OR PLEASURE IN DOING THINGS: NOT AT ALL
SUM OF ALL RESPONSES TO PHQ QUESTIONS 1-9: 0
SUM OF ALL RESPONSES TO PHQ QUESTIONS 1-9: 0

## 2025-05-14 NOTE — PATIENT INSTRUCTIONS
is never too late to quit. Try to avoid secondhand smoke too.     Stay at a weight that's healthy for you. Talk to your doctor if you need help losing weight.     Try to get 7 to 9 hours of sleep each night.     Limit alcohol to 2 drinks a day for men and 1 drink a day for women. Too much alcohol can cause health problems.     Manage other health problems such as diabetes, high blood pressure, and high cholesterol. If you think you may have a problem with alcohol or drug use, talk to your doctor.   Medicines    Take your medicines exactly as prescribed. Call your doctor if you think you are having a problem with your medicine.     If your doctor recommends aspirin, take the amount directed each day. Make sure you take aspirin and not another kind of pain reliever, such as acetaminophen (Tylenol).   When should you call for help?   Call 911 if you have symptoms of a heart attack. These may include:    Chest pain or pressure, or a strange feeling in the chest.     Sweating.     Shortness of breath.     Pain, pressure, or a strange feeling in the back, neck, jaw, or upper belly or in one or both shoulders or arms.     Lightheadedness or sudden weakness.     A fast or irregular heartbeat.   After you call 911, the  may tell you to chew 1 adult-strength or 2 to 4 low-dose aspirin. Wait for an ambulance. Do not try to drive yourself.  Watch closely for changes in your health, and be sure to contact your doctor if you have any problems.  Where can you learn more?  Go to https://www.[employer].net/patientEd and enter F075 to learn more about \"A Healthy Heart: Care Instructions.\"  Current as of: July 31, 2024  Content Version: 14.4  © 0748-0290 [employer].   Care instructions adapted under license by [employer]. If you have questions about a medical condition or this instruction, always ask your healthcare professional. [employer] disclaims any warranty or liability for your use of this

## 2025-05-14 NOTE — PROGRESS NOTES
Medicare Annual Wellness Visit    Nguyen Gibson is here for Medicare AWV (Patient due for AWV and to discuss recent labs)    Assessment & Plan  1. Medicare annual wellness visit, subsequent  2. Type 2 diabetes mellitus with stage 4 chronic kidney disease, without long-term current use of insulin ([facility])  3. Essential hypertension with goal blood pressure less than 140/90  4. Multiple myeloma, remission status unspecified ([facility])  5. Elevated LFTs  6. Hypercholesterolemia  7. Hyperthyroidism  8. Anemia due to stage 4 chronic kidney disease (HCC)  9. CKD (chronic kidney disease) stage 4, GFR 15-29 ml/min ([facility])  10. Multiple thyroid nodules      1. Hypertension.  - Blood pressure readings were elevated during this visit, although they have been well-managed at home.  - Carvedilol prescription will be discontinued from [facility], and future refills will be obtained via mail order.  - Blood pressure was 130/50 during the last visit.  - A recheck of her blood pressure will be conducted today.    2. Diabetes Mellitus.  - A1c level is approximately 6.4, indicating an improvement from previous readings.  - Rest of laboratory results are within normal limits.  - Advised to maintain a consistent diet and ensure she has food available to manage potential hypoglycemic episodes.  - If hypoglycemia occurs frequently, a reduction in insulin dosage may be considered.    3. Elevated liver enzymes.  - Liver enzymes are slightly elevated, which could be attributed to the increased dosage of rosuvastatin or the medication she is receiving for multiple myeloma.  - Liver enzyme levels were normal on 05/01/2025 but increased to 99 the following week.  - A recheck of liver enzymes will be conducted.  - If the elevation persists, a reduction in rosuvastatin dosage may be necessary.  Elevation likely related to medication used for multiple myeloma.  Sees heme/onc on Friday.    4. Thyroid disorder.  - On Tapazole for thyroid condition.  -

## 2025-05-15 DIAGNOSIS — I10 ESSENTIAL HYPERTENSION WITH GOAL BLOOD PRESSURE LESS THAN 140/90: ICD-10-CM

## 2025-05-15 RX ORDER — HYDRALAZINE HYDROCHLORIDE 10 MG/1
10 TABLET, FILM COATED ORAL 3 TIMES DAILY
Qty: 90 TABLET | Refills: 2 | Status: SHIPPED | OUTPATIENT
Start: 2025-05-15 | End: 2025-05-16 | Stop reason: SDUPTHER

## 2025-05-15 NOTE — TELEPHONE ENCOUNTER
Patient requesting a refill on       hydrALAZINE (APRESOLINE) 10 MG tablet       Needs to be sent to       [facility]/pharmacy #[id number] - [place] HENRIQUEZ - [address] - P [phone] - F [phone] [address] [place] [address]  Phone: [phone]  Fax: [phone]

## 2025-05-16 RX ORDER — HYDRALAZINE HYDROCHLORIDE 10 MG/1
10 TABLET, FILM COATED ORAL 3 TIMES DAILY
Qty: 90 TABLET | Refills: 2 | Status: SHIPPED | OUTPATIENT
Start: 2025-05-16 | End: 2026-05-16

## 2025-06-26 ENCOUNTER — OFFICE VISIT (OUTPATIENT)
Dept: PRIMARY CARE CLINIC | Facility: CLINIC | Age: 85
End: 2025-06-26
Payer: MEDICARE

## 2025-06-26 VITALS
HEIGHT: 62 IN | HEART RATE: 78 BPM | SYSTOLIC BLOOD PRESSURE: 135 MMHG | WEIGHT: 158 LBS | BODY MASS INDEX: 29.08 KG/M2 | TEMPERATURE: 97.9 F | OXYGEN SATURATION: 99 % | DIASTOLIC BLOOD PRESSURE: 52 MMHG

## 2025-06-26 DIAGNOSIS — M79.604 PAIN IN BOTH LOWER EXTREMITIES: ICD-10-CM

## 2025-06-26 DIAGNOSIS — M79.605 PAIN IN BOTH LOWER EXTREMITIES: ICD-10-CM

## 2025-06-26 DIAGNOSIS — G61.82 MULTIFOCAL MOTOR NEUROPATHY (HCC): Primary | ICD-10-CM

## 2025-06-26 DIAGNOSIS — C90.00 MULTIPLE MYELOMA, REMISSION STATUS UNSPECIFIED (HCC): ICD-10-CM

## 2025-06-26 PROCEDURE — G8427 DOCREV CUR MEDS BY ELIG CLIN: HCPCS | Performed by: FAMILY MEDICINE

## 2025-06-26 PROCEDURE — 1036F TOBACCO NON-USER: CPT | Performed by: FAMILY MEDICINE

## 2025-06-26 PROCEDURE — 1160F RVW MEDS BY RX/DR IN RCRD: CPT | Performed by: FAMILY MEDICINE

## 2025-06-26 PROCEDURE — 1123F ACP DISCUSS/DSCN MKR DOCD: CPT | Performed by: FAMILY MEDICINE

## 2025-06-26 PROCEDURE — 99214 OFFICE O/P EST MOD 30 MIN: CPT | Performed by: FAMILY MEDICINE

## 2025-06-26 PROCEDURE — 1159F MED LIST DOCD IN RCRD: CPT | Performed by: FAMILY MEDICINE

## 2025-06-26 PROCEDURE — G8417 CALC BMI ABV UP PARAM F/U: HCPCS | Performed by: FAMILY MEDICINE

## 2025-06-26 PROCEDURE — 1090F PRES/ABSN URINE INCON ASSESS: CPT | Performed by: FAMILY MEDICINE

## 2025-06-26 RX ORDER — GABAPENTIN 100 MG/1
100 CAPSULE ORAL 3 TIMES DAILY PRN
Qty: 90 CAPSULE | Refills: 0 | Status: SHIPPED | OUTPATIENT
Start: 2025-06-26 | End: 2025-07-26

## 2025-06-26 NOTE — PROGRESS NOTES
05/07/2025 50  40 - 60 MG/DL Final    LDL Cholesterol 05/07/2025 90  0 - 100 MG/DL Final    Comment: Near Optimal: 100-129 mg/dL  Borderline High: 130-159, High: 160-189 mg/dL  Very High: Greater than or equal to 190 mg/dL      VLDL Cholesterol Calculated 05/07/2025 23  6 - 23 MG/DL Final    Chol/HDL Ratio 05/07/2025 3.2  0.0 - 5.0   Final    Sodium 05/07/2025 142  136 - 145 mmol/L Final    Potassium 05/07/2025 4.2  3.5 - 5.1 mmol/L Final    Chloride 05/07/2025 103  98 - 107 mmol/L Final    CO2 05/07/2025 25  20 - 29 mmol/L Final    Anion Gap 05/07/2025 15  7 - 16 mmol/L Final    Glucose 05/07/2025 80  70 - 99 mg/dL Final    Comment: <70 mg/dL Consistent with, but not fully diagnostic of hypoglycemia.  100 - 125 mg/dL Impaired fasting glucose/consistent with pre-diabetes mellitus.  > 126 mg/dl Fasting glucose consistent with overt diabetes mellitus      BUN 05/07/2025 22  8 - 23 MG/DL Final    Creatinine 05/07/2025 1.29 (H)  0.60 - 1.10 MG/DL Final    Est, Glom Filt Rate 05/07/2025 40 (L)  >60 ml/min/1.73m2 Final    Comment:   Pediatric calculator link: https://www.kidney.org/professionals/kdoqi/gfr_calculatorped    These results are not intended for use in patients <18 years of age.    eGFR results are calculated without a race factor using  the 2021 CKD-EPI equation. Careful clinical correlation is recommended, particularly when comparing to results calculated using previous equations.  The CKD-EPI equation is less accurate in patients with extremes of muscle mass, extra-renal metabolism of creatinine, excessive creatine ingestion, or following therapy that affects renal tubular secretion.      Calcium 05/07/2025 9.9  8.8 - 10.2 MG/DL Final    Total Bilirubin 05/07/2025 0.3  0.0 - 1.2 MG/DL Final    ALT 05/07/2025 99 (H)  8 - 45 U/L Final    AST 05/07/2025 52 (H)  15 - 37 U/L Final    Alk Phosphatase 05/07/2025 77  35 - 104 U/L Final    Total Protein 05/07/2025 6.9  6.3 - 8.2 g/dL Final    Albumin 05/07/2025 3.8

## 2025-07-10 ENCOUNTER — TELEPHONE (OUTPATIENT)
Dept: PRIMARY CARE CLINIC | Facility: CLINIC | Age: 85
End: 2025-07-10

## 2025-07-10 DIAGNOSIS — M79.604 PAIN IN BOTH LOWER EXTREMITIES: ICD-10-CM

## 2025-07-10 DIAGNOSIS — M17.0 PRIMARY OSTEOARTHRITIS OF BOTH KNEES: Primary | ICD-10-CM

## 2025-07-10 DIAGNOSIS — M79.605 PAIN IN BOTH LOWER EXTREMITIES: ICD-10-CM

## 2025-07-10 NOTE — TELEPHONE ENCOUNTER
Prescription is sent in for 20 units nightly but said you increased it to 25 units nightly.  The pharmacy will not fill stating Medicare will not cover until 7/21.  She is out and can't get it filled.  States one pen would last until 7/21.  Samples? Or new rx to pharmacy?  [facility] [address]  Mayur can you call her to let her know about the insulin-Steph [phone]    Also can she get a refill for Tramadol for her back pain and legs

## 2025-07-15 ENCOUNTER — TELEPHONE (OUTPATIENT)
Dept: PRIMARY CARE CLINIC | Facility: CLINIC | Age: 85
End: 2025-07-15

## 2025-07-15 RX ORDER — INSULIN GLARGINE-YFGN 100 [IU]/ML
30 INJECTION, SOLUTION SUBCUTANEOUS NIGHTLY
Qty: 4 ML | Refills: 5 | Status: SHIPPED | OUTPATIENT
Start: 2025-07-15

## 2025-07-15 RX ORDER — TRAMADOL HYDROCHLORIDE 50 MG/1
50 TABLET ORAL EVERY 6 HOURS PRN
Qty: 90 TABLET | Refills: 0 | Status: SHIPPED | OUTPATIENT
Start: 2025-07-15 | End: 2025-08-14

## 2025-07-15 NOTE — TELEPHONE ENCOUNTER
I sent rx for 30 units dairussell.  Tell pt to continue with 25 units.  I wrote it this way just in case we need to increase later.